# Patient Record
Sex: FEMALE | Race: WHITE | NOT HISPANIC OR LATINO
[De-identification: names, ages, dates, MRNs, and addresses within clinical notes are randomized per-mention and may not be internally consistent; named-entity substitution may affect disease eponyms.]

---

## 2017-08-07 ENCOUNTER — APPOINTMENT (OUTPATIENT)
Dept: PULMONOLOGY | Facility: CLINIC | Age: 52
End: 2017-08-07
Payer: COMMERCIAL

## 2017-08-07 VITALS
BODY MASS INDEX: 30.21 KG/M2 | WEIGHT: 160 LBS | DIASTOLIC BLOOD PRESSURE: 70 MMHG | HEIGHT: 61 IN | RESPIRATION RATE: 16 BRPM | OXYGEN SATURATION: 99 % | SYSTOLIC BLOOD PRESSURE: 110 MMHG | HEART RATE: 98 BPM

## 2017-08-07 DIAGNOSIS — Z78.9 OTHER SPECIFIED HEALTH STATUS: ICD-10-CM

## 2017-08-07 DIAGNOSIS — Z80.1 FAMILY HISTORY OF MALIGNANT NEOPLASM OF TRACHEA, BRONCHUS AND LUNG: ICD-10-CM

## 2017-08-07 DIAGNOSIS — Z82.49 FAMILY HISTORY OF ISCHEMIC HEART DISEASE AND OTHER DISEASES OF THE CIRCULATORY SYSTEM: ICD-10-CM

## 2017-08-07 DIAGNOSIS — Z82.5 FAMILY HISTORY OF ASTHMA AND OTHER CHRONIC LOWER RESPIRATORY DISEASES: ICD-10-CM

## 2017-08-07 DIAGNOSIS — Z87.09 PERSONAL HISTORY OF OTHER DISEASES OF THE RESPIRATORY SYSTEM: ICD-10-CM

## 2017-08-07 DIAGNOSIS — F41.9 ANXIETY DISORDER, UNSPECIFIED: ICD-10-CM

## 2017-08-07 DIAGNOSIS — Z87.59 PERSONAL HISTORY OF OTHER COMPLICATIONS OF PREGNANCY, CHILDBIRTH AND THE PUERPERIUM: ICD-10-CM

## 2017-08-07 DIAGNOSIS — Z87.891 PERSONAL HISTORY OF NICOTINE DEPENDENCE: ICD-10-CM

## 2017-08-07 PROCEDURE — 94729 DIFFUSING CAPACITY: CPT

## 2017-08-07 PROCEDURE — 99205 OFFICE O/P NEW HI 60 MIN: CPT | Mod: 25

## 2017-08-07 PROCEDURE — 94727 GAS DIL/WSHOT DETER LNG VOL: CPT

## 2017-08-07 PROCEDURE — 71020: CPT

## 2017-08-07 PROCEDURE — 94620 PULMONARY STRESS TESTING SIMPLE: CPT

## 2017-08-07 PROCEDURE — 94010 BREATHING CAPACITY TEST: CPT | Mod: 59

## 2017-08-07 RX ORDER — FLUTICASONE PROPIONATE 50 MCG
50 SPRAY, SUSPENSION NASAL
Refills: 0 | Status: ACTIVE | COMMUNITY

## 2017-08-07 RX ORDER — FEXOFENADINE HCL 60 MG
CAPSULE ORAL
Refills: 0 | Status: ACTIVE | COMMUNITY

## 2017-08-07 RX ORDER — MONTELUKAST 10 MG/1
10 TABLET, FILM COATED ORAL
Qty: 1 | Refills: 1 | Status: ACTIVE | COMMUNITY
Start: 2017-08-07 | End: 1900-01-01

## 2017-08-07 RX ORDER — ERGOCALCIFEROL 1.25 MG/1
1.25 MG CAPSULE, LIQUID FILLED ORAL
Refills: 0 | Status: ACTIVE | COMMUNITY

## 2017-08-07 RX ORDER — OMEPRAZOLE 40 MG/1
40 CAPSULE, DELAYED RELEASE ORAL
Refills: 0 | Status: ACTIVE | COMMUNITY

## 2017-08-07 RX ORDER — OLOPATADINE HYDROCHLORIDE 665 UG/1
0.6 SPRAY, METERED NASAL
Qty: 3 | Refills: 1 | Status: ACTIVE | COMMUNITY
Start: 2017-08-07 | End: 1900-01-01

## 2017-09-22 ENCOUNTER — APPOINTMENT (OUTPATIENT)
Dept: PULMONOLOGY | Facility: CLINIC | Age: 52
End: 2017-09-22

## 2019-06-10 ENCOUNTER — EMERGENCY (EMERGENCY)
Age: 54
LOS: 1 days | Discharge: HOME | End: 2019-06-10
Admitting: EMERGENCY MEDICINE
Payer: COMMERCIAL

## 2019-06-10 VITALS
HEART RATE: 62 BPM | RESPIRATION RATE: 18 BRPM | TEMPERATURE: 99 F | DIASTOLIC BLOOD PRESSURE: 74 MMHG | SYSTOLIC BLOOD PRESSURE: 114 MMHG | OXYGEN SATURATION: 100 %

## 2019-06-10 VITALS
OXYGEN SATURATION: 99 % | SYSTOLIC BLOOD PRESSURE: 104 MMHG | TEMPERATURE: 98 F | HEART RATE: 60 BPM | DIASTOLIC BLOOD PRESSURE: 70 MMHG | RESPIRATION RATE: 19 BRPM

## 2019-06-10 PROCEDURE — 72100 X-RAY EXAM L-S SPINE 2/3 VWS: CPT | Mod: 26

## 2019-06-10 PROCEDURE — 73080 X-RAY EXAM OF ELBOW: CPT | Mod: 26,LT

## 2019-06-10 PROCEDURE — 73564 X-RAY EXAM KNEE 4 OR MORE: CPT | Mod: 26,LT

## 2019-06-10 PROCEDURE — 73030 X-RAY EXAM OF SHOULDER: CPT | Mod: 26,LT

## 2019-06-10 PROCEDURE — 99284 EMERGENCY DEPT VISIT MOD MDM: CPT

## 2019-06-10 RX ORDER — LIDOCAINE 4 G/100G
1 CREAM TOPICAL ONCE
Refills: 0 | Status: COMPLETED | OUTPATIENT
Start: 2019-06-10 | End: 2019-06-10

## 2019-06-10 RX ORDER — ACETAMINOPHEN 500 MG
650 TABLET ORAL ONCE
Refills: 0 | Status: COMPLETED | OUTPATIENT
Start: 2019-06-10 | End: 2019-06-10

## 2019-06-10 RX ORDER — IBUPROFEN 200 MG
600 TABLET ORAL ONCE
Refills: 0 | Status: COMPLETED | OUTPATIENT
Start: 2019-06-10 | End: 2019-06-10

## 2019-06-10 RX ADMIN — LIDOCAINE 1 PATCH: 4 CREAM TOPICAL at 16:58

## 2019-06-10 RX ADMIN — Medication 650 MILLIGRAM(S): at 16:57

## 2019-06-10 RX ADMIN — Medication 600 MILLIGRAM(S): at 16:57

## 2019-06-10 NOTE — ED STATDOCS - OBJECTIVE STATEMENT
I performed a medical screening examination and determined this patient to be medically stable and will transfer to the [facility] adult ED for further care. heart and lung exam done and both did not reveal concerns for immediate intervention. Vital signs reviewed.  [facility] red attending aware. Isis Olivo MD

## 2019-06-10 NOTE — ED PROVIDER NOTE - NSFOLLOWUPINSTRUCTIONS_ED_ALL_ED_FT
Follow up with your primary care provider within 48 hours  Take Motrin 600mg every 6 hours for pain, take with food  Apply heat to the low back for pain relief  Return to the ER with any worsening or concerning symptoms, increased pain, weakness, numbness, bowel or bladder incontinence or any other concerns.
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
## 2019-06-10 NOTE — ED PROVIDER NOTE - PHYSICAL EXAMINATION
msk: no c-spine or midline spinal tenderness  Neuro: A&Ox3, PERRL, CN II-VII intact, sensation intact and equal b/l, strength 5/5 in all extremities, no gait abnormality, normal finger to nose, normal rapid alternating movements

## 2019-06-10 NOTE — ED PROVIDER NOTE - CHPI ED SYMPTOMS NEG
Denies dizziness, nausea, vomiting, difficulty ambulating, LOC, blood thinner use, neck pain, or any other concerns/no loss of consciousness
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
## 2019-07-08 ENCOUNTER — RESULT REVIEW (OUTPATIENT)
Age: 54
End: 2019-07-08

## 2020-03-09 PROBLEM — Z78.9 OTHER SPECIFIED HEALTH STATUS: Chronic | Status: ACTIVE | Noted: 2019-06-10

## 2020-03-10 ENCOUNTER — APPOINTMENT (OUTPATIENT)
Dept: PULMONOLOGY | Facility: CLINIC | Age: 55
End: 2020-03-10
Payer: COMMERCIAL

## 2020-03-10 ENCOUNTER — NON-APPOINTMENT (OUTPATIENT)
Age: 55
End: 2020-03-10

## 2020-03-10 VITALS
SYSTOLIC BLOOD PRESSURE: 122 MMHG | RESPIRATION RATE: 17 BRPM | HEIGHT: 60 IN | OXYGEN SATURATION: 98 % | DIASTOLIC BLOOD PRESSURE: 80 MMHG | HEART RATE: 64 BPM | WEIGHT: 163 LBS | BODY MASS INDEX: 32 KG/M2

## 2020-03-10 PROCEDURE — 94010 BREATHING CAPACITY TEST: CPT

## 2020-03-10 PROCEDURE — 94618 PULMONARY STRESS TESTING: CPT

## 2020-03-10 PROCEDURE — 71046 X-RAY EXAM CHEST 2 VIEWS: CPT

## 2020-03-10 PROCEDURE — 99204 OFFICE O/P NEW MOD 45 MIN: CPT | Mod: 25

## 2020-03-10 PROCEDURE — 95012 NITRIC OXIDE EXP GAS DETER: CPT

## 2020-03-10 RX ORDER — SERTRALINE HYDROCHLORIDE 100 MG/1
100 TABLET, FILM COATED ORAL
Refills: 0 | Status: DISCONTINUED | COMMUNITY
End: 2020-03-10

## 2020-03-10 RX ORDER — ESCITALOPRAM OXALATE 20 MG/1
20 TABLET, FILM COATED ORAL
Refills: 0 | Status: ACTIVE | COMMUNITY

## 2020-03-10 RX ORDER — OMEGA-3-ACID ETHYL ESTERS 1 G/1
1 CAPSULE, LIQUID FILLED ORAL
Refills: 0 | Status: ACTIVE | COMMUNITY

## 2020-03-10 RX ORDER — RANITIDINE HYDROCHLORIDE 300 MG/1
300 CAPSULE ORAL
Qty: 1 | Refills: 1 | Status: DISCONTINUED | COMMUNITY
Start: 2017-08-07 | End: 2020-03-10

## 2020-03-10 NOTE — HISTORY OF PRESENT ILLNESS
[FreeTextEntry1] : Meera Gregg is a 55 y/o female presenting to the office today for a follow up regarding a CC of \par - she notes when she takes a deep breathe, she gets chest pain. This started about 2-3 days ago. Before that, this discomfort has been on and off for awhile. \par - She started a new job in September. \par - she notes she has SOB on her back, sitting still, and in the middle of the night \par - She notes she has always had GERD\par - She notes her bowels are regular \par - she notes she has been getting enough sleep since she got her new job. \par - she notes her memory and concentration has gotten worse. \par - she notes she still gets severe migraines. \par -She did a sleep study ([facility]) was told she has mild sleep apnea. \par - she has been \par -she denies any headaches, nausea, vomiting, fever, chills, sweats, chest pain, chest pressure, diarrhea, constipation, dysphagia, dizziness, sour taste in the mouth, leg swelling, leg pain, itchy eyes, itchy ears. \par

## 2020-03-10 NOTE — PHYSICAL EXAM
[General Appearance - Well Developed] : well developed [Normal Appearance] : normal appearance [Well Groomed] : well groomed [General Appearance - Well Nourished] : well nourished [No Deformities] : no deformities [General Appearance - In No Acute Distress] : no acute distress [Normal Conjunctiva] : the conjunctiva exhibited no abnormalities [Eyelids - No Xanthelasma] : the eyelids demonstrated no xanthelasmas [Normal Oropharynx] : normal oropharynx [III] : III [Neck Appearance] : the appearance of the neck was normal [Neck Cervical Mass (___cm)] : no neck mass was observed [Jugular Venous Distention Increased] : there was no jugular-venous distention [Thyroid Diffuse Enlargement] : the thyroid was not enlarged [Thyroid Nodule] : there were no palpable thyroid nodules [Heart Rate And Rhythm] : heart rate and rhythm were normal [Heart Sounds] : normal S1 and S2 [Murmurs] : no murmurs present [Respiration, Rhythm And Depth] : normal respiratory rhythm and effort [Exaggerated Use Of Accessory Muscles For Inspiration] : no accessory muscle use [Auscultation Breath Sounds / Voice Sounds] : lungs were clear to auscultation bilaterally [Abdomen Soft] : soft [Abdomen Tenderness] : non-tender [Abdomen Mass (___ Cm)] : no abdominal mass palpated [Abnormal Walk] : normal gait [Gait - Sufficient For Exercise Testing] : the gait was sufficient for exercise testing [Nail Clubbing] : no clubbing of the fingernails [Cyanosis, Localized] : no localized cyanosis [Petechial Hemorrhages (___cm)] : no petechial hemorrhages [Skin Color & Pigmentation] : normal skin color and pigmentation [Skin Turgor] : normal skin turgor [] : no rash [Deep Tendon Reflexes (DTR)] : deep tendon reflexes were 2+ and symmetric [Sensation] : the sensory exam was normal to light touch and pinprick [No Focal Deficits] : no focal deficits [Oriented To Time, Place, And Person] : oriented to person, place, and time [Impaired Insight] : insight and judgment were intact [Affect] : the affect was normal [FreeTextEntry1] : I:E ratio 1:3; clear

## 2020-03-10 NOTE — PROCEDURE
[FreeTextEntry1] : PFT revealed normal flows, with a FEV1 of 3.02 L, which is 129% of predicted, with a normal flow volume loop\par \par FENO was 9; a normal value being less than 25\par Fractional exhaled nitric oxide (FENO) is regarded as a simple, noninvasive method for assessing eosinophilic airway inflammation. Produced by a variety of cells within the lung, nitric oxide (NO) concentrations are generally low in healthy individuals. However, high concentrations of NO appear to be involved in nonspecific host defense mechanisms and chronic inflammatory diseases such as asthma. The American Thoracic Society (ATS) therefore has recommended using FENO to aid in the diagnosis and monitoring of eosinophilic airway inflammation and asthma, and for identifying steroid responsive individuals whose chronic respiratory symptoms may be caused by airway inflammation.\par \par 6 minute walk test reveals a low saturation of 95 % with no evidence of dyspnea or fatigue; walked 684.6 meters\par \par \par CXR reveals a normal sized heart; no evidence of infiltrate or effusion--a normal appearing chest radiograph\par \par

## 2020-03-10 NOTE — ADDENDUM
[FreeTextEntry1] : Documented by Birdie Bridges acting as a scribe for Dr. Santiago Denton on 03/10/2020.\par \par All medical record entries made by the Scribe were at my, Dr. Santiago Denton's, direction and personally dictated by me on 03/10/2020. I have reviewed the chart and agree that the record accurately reflects my personal performance of the history, physical exam, assessment and plan. I have also personally directed, reviewed, and agree with the discharge instructions.\par

## 2020-03-10 NOTE — ASSESSMENT
[FreeTextEntry1] : Mr.s Gregg is a 55 y/o female with a pmhx of 10 pack year smoker, cholesterol, migraines, TMJ, anxiety, asthma, allergies, low vitamin D, OW, Mil ; here for an evaluation of breathing  / recent PNA / SOB - recent DULCE MARIA (mild)\par \par Problem one: SOB is multifactorial \par -over weight/ out of shape\par -poor breathing mechanics\par -asthma \par -?Cardiac disease\par -anemia\par \par Problem 1: GERD\par - add Protonix 40 mg before breakfast  / Add Pepcid 40 mg QHS\par -Rule of 2s: avoid eating too much, eating too fast, eating too late, eating too spicy, eating too lousy, eating two hours before bed.\par -Things to avoid including overeating, spicy foods, tight clothing, eating within three hours of bed, this list is not all inclusive. \par -For treatment of reflux, possible options discussed including diet control, H2 blockers, PPIs, as well as coating motility agents discussed as treatment options. Timing of meals and proximity of last meal to sleep were discussed. If symptoms persist, a formal gastrointestinal evaluation is needed.\par \par Problem 2: poor breathing mechanics \par - -Proper breathing techniques were reviewed with an emphasis of exhalation. Patient instructed to breath in for 1 second and out for four seconds. Patient was encouraged to not talk while walking. \par \par Problem 3: over weight \par  -Weight loss, exercise, and diet control were discussed and are highly encouraged. Treatment options were given such as, aqua therapy, and contacting a nutritionist. Recommended to use the elliptical, stationary bike, less use of treadmill.  Obesity is associated with worsening asthma, shortness of breath, and potential for cardiac disease, diabetes, and other underlying medical conditions. \par \par Problem 4: Asthma \par -Breo 200 1 puff QD \par -Xopenex PRN for rescue \par  -Asthma is  believed to be caused by inherited (genetic) and environmental factor, but its exact cause is unknown. Asthma may be triggered by allergens, lung infections, or irritants in the air. Asthma triggers are different for each person \par \par Problem 5: Allergies\par - continue Flonase 1 sniff BID \par - continue Olopatadine (.6) 1 sniff BID \par - Environmental measures for allergies were encouraged including mattress and pillow cover, air purifier, and environmental controls. \par \par Problem 6: (+) OSAS (likely fibromyalgia related to sleep)\par - S/p HSS - c/w mild sleep apnea \par - Recommend Dental Device - Ryegate \par - Discussed the risks/associations with coronary artery disease, atrial fibrillation, arrhythmia, memory loss, issues with concentration, stroke risk, hypertension, nocturia, chronic reflux/Ramsey’s esophagus some but not all inclusive. Treatment options discussed including CPAP/BiPAP machine, oral appliance, ProVent therapy, Oxy-Aid by Respitec, new technologies, or positional sleep. \par -recommended sleep guard QD at bedtime \par -Good sleep hygiene was encouraged including avoiding watching television an hour before bed, keeping caffeine at a low,  avoiding reading, television, or anything, in bed, no drinking any liquids three hours before bedtime, and only getting into bed when tired and ready for sleep. \par - Sleep apnea is associated with adverse clinical consequences which an affect most organ systems. Cardiovascular disease risk includes arrhythmias, atrial fibrillation, hypertension, coronary artery disease, and stroke. Metabolic disorders include diabetes type 2, non-alcoholic fatty liver disease. Mood disorder especially depression; and cognitive decline especially in the elderly. Associations with chronic reflux/Ramsey’s esophagus some but not all inclusive. \par -Reasons include arousal consistent with hypopnea; respiratory events most prominent in REM sleep or supine position; therefore sleep staging and body position are important for accurate diagnosis and estimation of AHI.\par \par \par F/u in 3-4 months  \par pt is encouraged to call or fax the office with any questions or concerns. \par Pt is to exercise and diet to promote a healthy weight. \par Explained to the pt in full detail with demonstrations how to use the inhalers and inhaler hygiene. \par

## 2020-09-15 ENCOUNTER — APPOINTMENT (OUTPATIENT)
Dept: PULMONOLOGY | Facility: CLINIC | Age: 55
End: 2020-09-15
Payer: COMMERCIAL

## 2020-09-15 VITALS
RESPIRATION RATE: 17 BRPM | OXYGEN SATURATION: 98 % | WEIGHT: 174 LBS | DIASTOLIC BLOOD PRESSURE: 76 MMHG | BODY MASS INDEX: 34.16 KG/M2 | HEIGHT: 60 IN | TEMPERATURE: 96.9 F | HEART RATE: 65 BPM | SYSTOLIC BLOOD PRESSURE: 126 MMHG

## 2020-09-15 DIAGNOSIS — Z23 ENCOUNTER FOR IMMUNIZATION: ICD-10-CM

## 2020-09-15 PROCEDURE — 95012 NITRIC OXIDE EXP GAS DETER: CPT

## 2020-09-15 PROCEDURE — 90682 RIV4 VACC RECOMBINANT DNA IM: CPT

## 2020-09-15 PROCEDURE — 99214 OFFICE O/P EST MOD 30 MIN: CPT | Mod: 25

## 2020-09-15 PROCEDURE — G0008: CPT

## 2020-09-15 NOTE — HISTORY OF PRESENT ILLNESS
[FreeTextEntry1] : Meera Gregg is a 53 y/o female presenting to the office today for a follow up visit. Her chief complaint is active asthma\par -she reports she has had 3 asthma attacks over the past week. 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\par -she states she had some SOB, and wasn’t able to find her inhalers\par -she was using her Breo daily, but didn’t feel any improvement\par -she notes she has some reflux, and taking her GERD medication a few times per week\par -she notes she is eating healthier but is gaining weight\par -she notes she is sleeping well, but hasn’t been able to get her sleep study yet\par -she notes she has been having more severe allergy sx recently, and is taking allergy medication daily\par -she denies any headaches, nausea, vomiting, fever, chills, sweats, chest pain, chest pressure, diarrhea, constipation, dysphagia, dizziness, sour taste in the mouth, leg swelling, leg pain, itchy eyes, itchy ears, heartburn, reflux, myalgias or arthralgias.

## 2020-09-15 NOTE — ASSESSMENT
[FreeTextEntry1] : Mr.s Gregg is a 56 y/o female with a pmhx of 10 pack year smoker, cholesterol, migraines, TMJ, anxiety, asthma, allergies, low vitamin D, OW, Mil ; here for an evaluation of breathing  / recent PNA / SOB - recent DULCE MARIA (mild) - ?active asthma / allergy\par \par Problem one: SOB is multifactorial \par -over weight/ out of shape\par -poor breathing mechanics\par -asthma \par -?Cardiac disease\par -anemia\par \par Problem 1: GERD\par - add Protonix 40 mg before breakfast  / Add Pepcid 40 mg QHS\par -Rule of 2s: avoid eating too much, eating too fast, eating too late, eating too spicy, eating too lousy, eating two hours before bed.\par -Things to avoid including overeating, spicy foods, tight clothing, eating within three hours of bed, this list is not all inclusive. \par -For treatment of reflux, possible options discussed including diet control, H2 blockers, PPIs, as well as coating motility agents discussed as treatment options. Timing of meals and proximity of last meal to sleep were discussed. If symptoms persist, a formal gastrointestinal evaluation is needed.\par \par Problem 2: poor breathing mechanics \par - -Proper breathing techniques were reviewed with an emphasis of exhalation. Patient instructed to breath in for 1 second and out for four seconds. Patient was encouraged to not talk while walking. \par \par Problem 3: over weight \par  -Weight loss, exercise, and diet control were discussed and are highly encouraged. Treatment options were given such as, aqua therapy, and contacting a nutritionist. Recommended to use the elliptical, stationary bike, less use of treadmill.  Obesity is associated with worsening asthma, shortness of breath, and potential for cardiac disease, diabetes, and other underlying medical conditions. \par \par Problem 4: Asthma (?active)\par -DC Breo 200 1 puff QD - transition to Trelegy 1 inhalation QD\par -Xopenex PRN for rescue \par -add Singulair 10 mg before bed \par  -Asthma is  believed to be caused by inherited (genetic) and environmental factor, but its exact cause is unknown. Asthma may be triggered by allergens, lung infections, or irritants in the air. Asthma triggers are different for each person \par \par Problem 5: Allergies\par - continue Flonase 1 sniff BID \par - continue Olopatadine (.6) 1 sniff BID \par -Add Clarinex 5 mg before bed \par - Environmental measures for allergies were encouraged including mattress and pillow cover, air purifier, and environmental controls. \par \par Problem 6: (+) OSAS (likely fibromyalgia related to sleep)\par - S/p HSS - c/w mild sleep apnea (over 5 years ago)\par - Recommend Dental Device - Puposky \par - Discussed the risks/associations with coronary artery disease, atrial fibrillation, arrhythmia, memory loss, issues with concentration, stroke risk, hypertension, nocturia, chronic reflux/Ramsey’s esophagus some but not all inclusive. Treatment options discussed including CPAP/BiPAP machine, oral appliance, ProVent therapy, Oxy-Aid by Respitec, new technologies, or positional sleep. \par -recommended sleep guard QD at bedtime \par -Good sleep hygiene was encouraged including avoiding watching television an hour before bed, keeping caffeine at a low,  avoiding reading, television, or anything, in bed, no drinking any liquids three hours before bedtime, and only getting into bed when tired and ready for sleep. \par - Sleep apnea is associated with adverse clinical consequences which an affect most organ systems. Cardiovascular disease risk includes arrhythmias, atrial fibrillation, hypertension, coronary artery disease, and stroke. Metabolic disorders include diabetes type 2, non-alcoholic fatty liver disease. Mood disorder especially depression; and cognitive decline especially in the elderly. Associations with chronic reflux/Ramsey’s esophagus some but not all inclusive. \par -Reasons include arousal consistent with hypopnea; respiratory events most prominent in REM sleep or supine position; therefore sleep staging and body position are important for accurate diagnosis and estimation of AHI.\par \par Problem 5: health maintenance\par -s/p influenza vaccine 2020\par -recommended strep pneumonia vaccines after age 65: Prevnar-13 vaccine, followed by Pneumo vaccine 23 one year following\par -recommended early intervention for URIs\par -recommended regular osteoporosis evaluations\par -recommended early dermatological evaluations\par -recommended after the age of 50 to the age of 70, colonoscopy every 5 years \par \par F/u in 3-4 months  \par pt is encouraged to call or fax the office with any questions or concerns. \par Pt is to exercise and diet to promote a healthy weight. \par Explained to the pt in full detail with demonstrations how to use the inhalers and inhaler hygiene. \par

## 2020-09-15 NOTE — PHYSICAL EXAM
[General Appearance - Well Developed] : well developed [Well Groomed] : well groomed [Normal Appearance] : normal appearance [General Appearance - Well Nourished] : well nourished [General Appearance - In No Acute Distress] : no acute distress [No Deformities] : no deformities [Eyelids - No Xanthelasma] : the eyelids demonstrated no xanthelasmas [Normal Conjunctiva] : the conjunctiva exhibited no abnormalities [III] : III [Normal Oropharynx] : normal oropharynx [Neck Appearance] : the appearance of the neck was normal [Neck Cervical Mass (___cm)] : no neck mass was observed [Thyroid Diffuse Enlargement] : the thyroid was not enlarged [Jugular Venous Distention Increased] : there was no jugular-venous distention [Thyroid Nodule] : there were no palpable thyroid nodules [Heart Rate And Rhythm] : heart rate and rhythm were normal [Heart Sounds] : normal S1 and S2 [Murmurs] : no murmurs present [Respiration, Rhythm And Depth] : normal respiratory rhythm and effort [Exaggerated Use Of Accessory Muscles For Inspiration] : no accessory muscle use [Auscultation Breath Sounds / Voice Sounds] : lungs were clear to auscultation bilaterally [Abdomen Soft] : soft [Abdomen Tenderness] : non-tender [Abnormal Walk] : normal gait [Abdomen Mass (___ Cm)] : no abdominal mass palpated [Gait - Sufficient For Exercise Testing] : the gait was sufficient for exercise testing [Cyanosis, Localized] : no localized cyanosis [Petechial Hemorrhages (___cm)] : no petechial hemorrhages [Nail Clubbing] : no clubbing of the fingernails [Skin Turgor] : normal skin turgor [Skin Color & Pigmentation] : normal skin color and pigmentation [] : no rash [Deep Tendon Reflexes (DTR)] : deep tendon reflexes were 2+ and symmetric [Sensation] : the sensory exam was normal to light touch and pinprick [No Focal Deficits] : no focal deficits [Oriented To Time, Place, And Person] : oriented to person, place, and time [Impaired Insight] : insight and judgment were intact [Affect] : the affect was normal [FreeTextEntry1] : I:E ratio 1:3; clear

## 2020-09-15 NOTE — REVIEW OF SYSTEMS
[Negative] : Psychiatric [Dyspnea] : dyspnea [Seasonal Allergies] : seasonal allergies [Chest Discomfort] : no chest discomfort [GERD] : no gerd [Diarrhea] : no diarrhea [Constipation] : no constipation [Dysphagia] : no dysphagia [Dizziness] : no dizziness

## 2020-09-15 NOTE — PROCEDURE
[FreeTextEntry1] : FENO was 27; a normal value being less than 25\par Fractional exhaled nitric oxide (FENO) is regarded as a simple, noninvasive method for assessing eosinophilic airway inflammation. Produced by a variety of cells within the lung, nitric oxide (NO) concentrations are generally low in healthy individuals. However, high concentrations of NO appear to be involved in nonspecific host defense mechanisms and chronic inflammatory diseases such as asthma. The American Thoracic Society (ATS) therefore has recommended using FENO to aid in the diagnosis and monitoring of eosinophilic airway inflammation and asthma, and for identifying steroid responsive individuals whose chronic respiratory symptoms may be caused by airway inflammation.

## 2020-11-30 ENCOUNTER — RX RENEWAL (OUTPATIENT)
Age: 55
End: 2020-11-30

## 2020-12-26 ENCOUNTER — LABORATORY RESULT (OUTPATIENT)
Age: 55
End: 2020-12-26

## 2020-12-29 ENCOUNTER — APPOINTMENT (OUTPATIENT)
Dept: PULMONOLOGY | Facility: CLINIC | Age: 55
End: 2020-12-29
Payer: COMMERCIAL

## 2020-12-29 PROCEDURE — 95012 NITRIC OXIDE EXP GAS DETER: CPT

## 2020-12-29 PROCEDURE — 94010 BREATHING CAPACITY TEST: CPT

## 2020-12-29 PROCEDURE — 99214 OFFICE O/P EST MOD 30 MIN: CPT | Mod: 25

## 2020-12-29 PROCEDURE — 94727 GAS DIL/WSHOT DETER LNG VOL: CPT

## 2020-12-29 PROCEDURE — 94729 DIFFUSING CAPACITY: CPT

## 2020-12-29 PROCEDURE — 99072 ADDL SUPL MATRL&STAF TM PHE: CPT

## 2020-12-29 RX ORDER — PITAVASTATIN CALCIUM 2.09 MG/1
2 TABLET, FILM COATED ORAL
Qty: 90 | Refills: 0 | Status: ACTIVE | COMMUNITY
Start: 2020-12-29 | End: 1900-01-01

## 2020-12-29 NOTE — PROCEDURE
[FreeTextEntry1] : Full PFT revealed normal flows, with a FEV1 of 2.89L ,which is 125% of predicted, normal lung volumes, and a normal diffusion of 19.7, which is 91% of predicted with a normal flow volume loop \par \par FENO was 7; a normal value being less than 25\par Fractional exhaled nitric oxide (FENO) is regarded as a simple, noninvasive method for assessing eosinophilic airway inflammation. Produced by a variety of cells within the lung, nitric oxide (NO) concentrations are generally low in healthy individuals. However, high concentrations of NO appear to be involved in nonspecific host defense mechanisms and chronic inflammatory diseases such as asthma. The American Thoracic Society (ATS) therefore has recommended using FENO to aid in the diagnosis and monitoring of eosinophilic airway inflammation and asthma, and for identifying steroid responsive individuals whose chronic respiratory symptoms may be caused by airway inflammation.

## 2020-12-29 NOTE — ASSESSMENT
[FreeTextEntry1] : Mr.s Gregg is a 54 y/o female with a pmhx of 10 pack year smoker, cholesterol, migraines, TMJ, anxiety, asthma, allergies, low vitamin D, OW, Mil ; here for an evaluation of breathing  / recent PNA / SOB - DULCE MARIA (mild) - asthma / allergy - stable.\par \par Problem one: SOB is multifactorial \par -over weight/ out of shape\par -poor breathing mechanics\par -asthma \par -?Cardiac disease\par -anemia\par \par Problem 1: GERD\par -continue Protonix 40 mg before breakfast \par -continue Pepcid 40 mg QHS\par -Rule of 2s: avoid eating too much, eating too fast, eating too late, eating too spicy, eating too lousy, eating two hours before bed.\par -Things to avoid including overeating, spicy foods, tight clothing, eating within three hours of bed, this list is not all inclusive. \par -For treatment of reflux, possible options discussed including diet control, H2 blockers, PPIs, as well as coating motility agents discussed as treatment options. Timing of meals and proximity of last meal to sleep were discussed. If symptoms persist, a formal gastrointestinal evaluation is needed.\par \par Problem 2: poor breathing mechanics \par - -Proper breathing techniques were reviewed with an emphasis of exhalation. Patient instructed to breath in for 1 second and out for four seconds. Patient was encouraged to not talk while walking. \par \par Problem 3: over weight \par  -Weight loss, exercise, and diet control were discussed and are highly encouraged. Treatment options were given such as, aqua therapy, and contacting a nutritionist. Recommended to use the elliptical, stationary bike, less use of treadmill.  Obesity is associated with worsening asthma, shortness of breath, and potential for cardiac disease, diabetes, and other underlying medical conditions. \par \par Problem 4: Asthma (?active)\par -continue Breo 1 inhalation QD\par -Xopenex PRN for rescue \par -continue Singulair 10 mg before bed \par  -Asthma is  believed to be caused by inherited (genetic) and environmental factor, but its exact cause is unknown. Asthma may be triggered by allergens, lung infections, or irritants in the air. Asthma triggers are different for each person \par \par Problem 5: Allergies\par - continue Flonase 1 sniff BID \par - continue Olopatadine (.6) 1 sniff BID \par -Add Clarinex 5 mg before bed \par - Environmental measures for allergies were encouraged including mattress and pillow cover, air purifier, and environmental controls. \par \par Problem 6: (+) OSAS (likely fibromyalgia related to sleep)\par - S/p HSS - c/w mild sleep apnea (over 5 years ago)\par - Recommend Dental Device - [place] \par - Discussed the risks/associations with coronary artery disease, atrial fibrillation, arrhythmia, memory loss, issues with concentration, stroke risk, hypertension, nocturia, chronic reflux/Ramsey’s esophagus some but not all inclusive. Treatment options discussed including CPAP/BiPAP machine, oral appliance, ProVent therapy, Oxy-Aid by [employer]pite[employer], new technologies, or positional sleep. \par -recommended sleep guard QD at bedtime \par -Good sleep hygiene was encouraged including avoiding watching television an hour before bed, keeping caffeine at a low,  avoiding reading, television, or anything, in bed, no drinking any liquids three hours before bedtime, and only getting into bed when tired and ready for sleep. \par - Sleep apnea is associated with adverse clinical consequences which an affect most organ systems. Cardiovascular disease risk includes arrhythmias, atrial fibrillation, hypertension, coronary artery disease, and stroke. Metabolic disorders include diabetes type 2, non-alcoholic fatty liver disease. Mood disorder especially depression; and cognitive decline especially in the elderly. Associations with chronic reflux/Ramsey’s esophagus some but not all inclusive. \par -Reasons include arousal consistent with hypopnea; respiratory events most prominent in REM sleep or supine position; therefore sleep staging and body position are important for accurate diagnosis and estimation of AHI.\par \par Problem 5: health maintenance\par -s/p influenza vaccine 2020\par -recommended strep pneumonia vaccines after age 65: Prevnar-13 vaccine, followed by Pneumo vaccine 23 one year following\par -recommended early intervention for URIs\par -recommended regular osteoporosis evaluations\par -recommended early dermatological evaluations\par -recommended after the age of 50 to the age of 70, colonoscopy every 5 years \par \par F/u in 3-4 months  \par pt is encouraged to call or fax the office with any questions or concerns. \par Pt is to exercise and diet to promote a healthy weight. \par Explained to the pt in full detail with demonstrations how to use the inhalers and inhaler hygiene. \par

## 2020-12-29 NOTE — REVIEW OF SYSTEMS
[Negative] : Endocrine [Recent Wt Gain (___ Lbs)] : ~T recent [unfilled] lb weight gain [EDS] : EDS [Chest Tightness] : chest tightness [Dizziness] : dizziness [Cough] : no cough [Wheezing] : no wheezing [Chest Discomfort] : no chest discomfort [Palpitations] : no palpitations [GERD] : no gerd [Diarrhea] : no diarrhea [Constipation] : no constipation [Dysphagia] : no dysphagia

## 2020-12-29 NOTE — ADDENDUM
[FreeTextEntry1] : Documented by Maycol Bond acting as a scribe for Dr. Santiago Denton on 12/29/2020.\par \par All medical record entries made by the Scribe were at my, Dr. Santiago Denton's, direction and personally dictated by me on 12/29/2020 . I have reviewed the chart and agree that the record accurately reflects my personal performance of the history, physical exam, assessment and plan. I have also personally directed, reviewed, and agree with the discharge instructions. \par

## 2020-12-29 NOTE — HISTORY OF PRESENT ILLNESS
[FreeTextEntry1] : Meera Gregg is a 54 y/o female with a history of allergies, asthma, GERD, DULCE MARIA, overweight, poor sleep, snoring, and SOB presenting to the office today for a follow up visit. Her chief complaint is lethargy.\par -she reports she has been feeling lethargic\par -she notes she has occasional chest pressure / pain. 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 She has an exercise bike in her house.\par -she states she is able to breathe better in the cold air\par -she notes she isnt sleeping well, and believes it is related to her new mattress. Her 's snoring keeps her up as well.\par -she denies any coughing, wheezing, palpitations, diarrhea, constipation, dysphagia, sour taste in the mouth, heartburn, reflux

## 2021-02-03 NOTE — ED PROVIDER NOTE - ATTESTATION, MLM
Clofazimine Pregnancy And Lactation Text: This medication is Pregnancy Category C and isn't considered safe during pregnancy. It is excreted in breast milk. I have reviewed and confirmed nurses' notes for patient's medications, allergies, medical history, and surgical history.

## 2021-03-02 ENCOUNTER — RX RENEWAL (OUTPATIENT)
Age: 56
End: 2021-03-02

## 2021-03-15 ENCOUNTER — RX RENEWAL (OUTPATIENT)
Age: 56
End: 2021-03-15

## 2021-04-02 ENCOUNTER — APPOINTMENT (OUTPATIENT)
Dept: PULMONOLOGY | Facility: CLINIC | Age: 56
End: 2021-04-02
Payer: COMMERCIAL

## 2021-04-02 VITALS
BODY MASS INDEX: 32.47 KG/M2 | DIASTOLIC BLOOD PRESSURE: 70 MMHG | TEMPERATURE: 96.9 F | WEIGHT: 172 LBS | HEART RATE: 65 BPM | SYSTOLIC BLOOD PRESSURE: 118 MMHG | RESPIRATION RATE: 17 BRPM | HEIGHT: 61 IN | OXYGEN SATURATION: 98 %

## 2021-04-02 PROCEDURE — 99072 ADDL SUPL MATRL&STAF TM PHE: CPT

## 2021-04-02 PROCEDURE — 99214 OFFICE O/P EST MOD 30 MIN: CPT | Mod: 25

## 2021-04-02 NOTE — PHYSICAL EXAM
[General Appearance - Well Developed] : well developed [Well Groomed] : well groomed [Normal Appearance] : normal appearance [General Appearance - Well Nourished] : well nourished [No Deformities] : no deformities [General Appearance - In No Acute Distress] : no acute distress [Normal Conjunctiva] : the conjunctiva exhibited no abnormalities [Eyelids - No Xanthelasma] : the eyelids demonstrated no xanthelasmas [Normal Oropharynx] : normal oropharynx [III] : III [Neck Appearance] : the appearance of the neck was normal [Neck Cervical Mass (___cm)] : no neck mass was observed [Jugular Venous Distention Increased] : there was no jugular-venous distention [Thyroid Diffuse Enlargement] : the thyroid was not enlarged [Thyroid Nodule] : there were no palpable thyroid nodules [Heart Rate And Rhythm] : heart rate and rhythm were normal [Heart Sounds] : normal S1 and S2 [Murmurs] : no murmurs present [Respiration, Rhythm And Depth] : normal respiratory rhythm and effort [Exaggerated Use Of Accessory Muscles For Inspiration] : no accessory muscle use [Auscultation Breath Sounds / Voice Sounds] : lungs were clear to auscultation bilaterally [Abdomen Soft] : soft [Abdomen Tenderness] : non-tender [Abdomen Mass (___ Cm)] : no abdominal mass palpated [Abnormal Walk] : normal gait [Gait - Sufficient For Exercise Testing] : the gait was sufficient for exercise testing [Nail Clubbing] : no clubbing of the fingernails [Cyanosis, Localized] : no localized cyanosis [Petechial Hemorrhages (___cm)] : no petechial hemorrhages [Skin Color & Pigmentation] : normal skin color and pigmentation [Skin Turgor] : normal skin turgor [] : no rash [Deep Tendon Reflexes (DTR)] : deep tendon reflexes were 2+ and symmetric [Sensation] : the sensory exam was normal to light touch and pinprick [No Focal Deficits] : no focal deficits [Oriented To Time, Place, And Person] : oriented to person, place, and time [Impaired Insight] : insight and judgment were intact [Affect] : the affect was normal [FreeTextEntry1] : I:E ratio 1:3; clear

## 2021-04-02 NOTE — ADDENDUM
[FreeTextEntry1] : Documented by Birdie Bridges acting as a scribe for Dr. Santiago Denton on 04/02/2021 \par \par All medical record entries made by the Scribe were at my, Dr. Santiago Denton's, direction and personally dictated by me on 04/02/2021 . I have reviewed the chart and agree that the record accurately reflects my personal performance of the history, physical exam, assessment and plan. I have also personally directed, reviewed, and agree with the discharge instructions.

## 2021-04-02 NOTE — ASSESSMENT
[FreeTextEntry1] : Mr.s Gregg is a 54 y/o female with a pmhx of 10 pack year smoker, cholesterol, migraines, TMJ, anxiety, asthma, allergies, low vitamin D, OW, Mil ; here for an evaluation of breathing  /  SOB - DULCE MARIA (mild) - asthma / allergy - stable., OW \par \par Problem one: SOB is multifactorial \par -over weight/ out of shape\par -poor breathing mechanics\par -asthma \par -?Cardiac disease\par -anemia\par \par Problem 1: GERD\par -continue Protonix 40 mg before breakfast \par -continue Pepcid 40 mg QHS\par -Rule of 2s: avoid eating too much, eating too fast, eating too late, eating too spicy, eating too lousy, eating two hours before bed.\par -Things to avoid including overeating, spicy foods, tight clothing, eating within three hours of bed, this list is not all inclusive. \par -For treatment of reflux, possible options discussed including diet control, H2 blockers, PPIs, as well as coating motility agents discussed as treatment options. Timing of meals and proximity of last meal to sleep were discussed. If symptoms persist, a formal gastrointestinal evaluation is needed.\par \par Problem 2: poor breathing mechanics \par - recommended Buteyko / Wimhof breathing methods \par - -Proper breathing techniques were reviewed with an emphasis of exhalation. Patient instructed to breath in for 1 second and out for four seconds. Patient was encouraged to not talk while walking. \par \par Problem 3: over weight \par  -Weight loss, exercise, and diet control were discussed and are highly encouraged. Treatment options were given such as, aqua therapy, and contacting a nutritionist. Recommended to use the elliptical, stationary bike, less use of treadmill.  Obesity is associated with worsening asthma, shortness of breath, and potential for cardiac disease, diabetes, and other underlying medical conditions. \par \par Problem 4: Asthma\par -continue Breo 1 inhalation QD (200)\par -Xopenex PRN for rescue \par -continue Singulair 10 mg before bed \par  -Asthma is  believed to be caused by inherited (genetic) and environmental factor, but its exact cause is unknown. Asthma may be triggered by allergens, lung infections, or irritants in the air. Asthma triggers are different for each person \par \par Problem 5: Allergies\par - continue Flonase 1 sniff BID \par - continue Olopatadine (.6) 1 sniff BID \par -Add Clarinex 5 mg before bed \par - Environmental measures for allergies were encouraged including mattress and pillow cover, air purifier, and environmental controls. \par \par Problem 6: (+) OSAS (likely fibromyalgia related to sleep)- noncompliant \par - S/p HSS - c/w mild sleep apnea (over 5 years ago)\par - Recommend Dental Device - [place] \par - recommended Slumber Bump, OxyAide, Sleeprite \par - Discussed the risks/associations with coronary artery disease, atrial fibrillation, arrhythmia, memory loss, issues with concentration, stroke risk, hypertension, nocturia, chronic reflux/Ramsey’s esophagus some but not all inclusive. Treatment options discussed including CPAP/BiPAP machine, oral appliance, ProVent therapy, Oxy-Aid by [employer], new technologies, or positional sleep. \par -recommended sleep guard QD at bedtime \par -Good sleep hygiene was encouraged including avoiding watching television an hour before bed, keeping caffeine at a low,  avoiding reading, television, or anything, in bed, no drinking any liquids three hours before bedtime, and only getting into bed when tired and ready for sleep. \par - Sleep apnea is associated with adverse clinical consequences which an affect most organ systems. Cardiovascular disease risk includes arrhythmias, atrial fibrillation, hypertension, coronary artery disease, and stroke. Metabolic disorders include diabetes type 2, non-alcoholic fatty liver disease. Mood disorder especially depression; and cognitive decline especially in the elderly. Associations with chronic reflux/Ramsey’s esophagus some but not all inclusive. \par -Reasons include arousal consistent with hypopnea; respiratory events most prominent in REM sleep or supine position; therefore sleep staging and body position are important for accurate diagnosis and estimation of AHI.\par \par Problem 5: health maintenance\par - s/p COVID-19 vaccine x2 (Moderna) \par -s/p influenza vaccine 2020\par -recommended strep pneumonia vaccines after age 65: Prevnar-13 vaccine, followed by Pneumo vaccine 23 one year following\par -recommended early intervention for URIs\par -recommended regular osteoporosis evaluations\par -recommended early dermatological evaluations\par -recommended after the age of 50 to the age of 70, colonoscopy every 5 years \par \par F/u in 3-4 months  \par pt is encouraged to call or fax the office with any questions or concerns. \par Pt is to exercise and diet to promote a healthy weight. \par Explained to the pt in full detail with demonstrations how to use the inhalers and inhaler hygiene. \par

## 2021-04-02 NOTE — HISTORY OF PRESENT ILLNESS
[FreeTextEntry1] : Meera Gregg is a 56 y/o female with a history of allergies, asthma, GERD, DULCE MARIA, overweight, poor sleep, snoring, and SOB presenting to the office today for a follow up visit. 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 \par - she notes she is working two jobs \par - she has not gotten her mouth piece for sleep apnea yet \par - she is s/p both COVID-19 vaccine (Moderna)\par - her breathing becomes labored if she does too much, she believes its due to her being out of shape \par - She  denies any visual issues, headaches, nausea, vomiting, fever, chills, sweats, chest pains, chest pressure, diarrhea, constipation, dysphagia, myalgia, dizziness, leg swelling, leg pain, itchy eyes, itchy ears.

## 2021-08-05 ENCOUNTER — APPOINTMENT (OUTPATIENT)
Dept: PULMONOLOGY | Facility: CLINIC | Age: 56
End: 2021-08-05

## 2021-10-19 ENCOUNTER — RX RENEWAL (OUTPATIENT)
Age: 56
End: 2021-10-19

## 2021-11-26 ENCOUNTER — NON-APPOINTMENT (OUTPATIENT)
Age: 56
End: 2021-11-26

## 2021-11-26 ENCOUNTER — APPOINTMENT (OUTPATIENT)
Dept: PULMONOLOGY | Facility: CLINIC | Age: 56
End: 2021-11-26
Payer: COMMERCIAL

## 2021-11-26 VITALS
DIASTOLIC BLOOD PRESSURE: 70 MMHG | TEMPERATURE: 97.2 F | HEIGHT: 61 IN | WEIGHT: 174 LBS | SYSTOLIC BLOOD PRESSURE: 120 MMHG | BODY MASS INDEX: 32.85 KG/M2 | OXYGEN SATURATION: 98 % | RESPIRATION RATE: 17 BRPM | HEART RATE: 80 BPM

## 2021-11-26 PROCEDURE — 94010 BREATHING CAPACITY TEST: CPT

## 2021-11-26 PROCEDURE — 99214 OFFICE O/P EST MOD 30 MIN: CPT | Mod: 25

## 2021-11-26 RX ORDER — FLUTICASONE FUROATE, UMECLIDINIUM BROMIDE AND VILANTEROL TRIFENATATE 100; 62.5; 25 UG/1; UG/1; UG/1
100-62.5-25 POWDER RESPIRATORY (INHALATION)
Qty: 3 | Refills: 1 | Status: DISCONTINUED | COMMUNITY
Start: 2020-09-15 | End: 2021-11-26

## 2021-11-26 NOTE — HISTORY OF PRESENT ILLNESS
[FreeTextEntry1] : Meera Gregg is a 54 y/o female with a history of allergies, asthma, GERD, DULCE MARIA, overweight, poor sleep, snoring, and SOB presenting to the office today for a follow up visit. Her chief complaint is\par -she notes sub mandible tenderness bilaterally\par -she notes sleeping on her side \par -She notes PND\par -she notes her bowels are regular \par -she notes her sense of smell and taste are poor\par -She notes walking 1.5-3 miles per day \par -She denies any visual issues\par -she notes constant fatigue\par -she notes snoring \par -she notes SOB when walking or with higher humidity levels \par -she notes reflux, exacerbated since her previous\par -she notes still waiting on DD for OSAS \par -s/p covid 19 vaccine x2 2/2021\par -She notes her weight is stable \par \par - patient denies any headaches, nausea, vomiting, fever, chills, sweats, chest pain, chest pressure, palpitations, coughing, wheezing, diarrhea, constipation, dysphagia, myalgias, dizziness, leg swelling, leg pain, itchy eyes, itchy ears, heartburn, reflux or sour taste in the mouth

## 2021-11-26 NOTE — ADDENDUM
[FreeTextEntry1] : Documented by Maycol Bond acting as a scribe for Dr. Santiago Denton on (11/26/2021).\par \par All medical record entries made by the Scribe were at my, Dr. Santiago Denton's, direction and personally dictated by me on (11/26/2021). I have reviewed the chart and agree that the record accurately reflects my personal performance of the history, physical exam, assessment and plan. I have also personally directed, reviewed, and agree with the discharge instructions.\par

## 2021-11-26 NOTE — ASSESSMENT
[FreeTextEntry1] : Mr.s Gregg is a 57 y/o female with a pmhx of 10 pack year smoker, cholesterol, migraines, TMJ, anxiety, asthma, allergies, low vitamin D, OW, Mil ; here for an evaluation of breathing  /  SOB - DULCE MARIA (mild) - asthma / allergy - stable., OW - active asthma \par \par Problem one: SOB is multifactorial \par -over weight/ out of shape\par -poor breathing mechanics\par -asthma \par -?Cardiac disease\par -anemia\par \par Problem 1: GERD\par -continue Protonix 40 mg before breakfast \par -continue Pepcid 40 mg QHS\par -Rule of 2s: avoid eating too much, eating too fast, eating too late, eating too spicy, eating too lousy, eating two hours before bed.\par -Things to avoid including overeating, spicy foods, tight clothing, eating within three hours of bed, this list is not all inclusive. \par -For treatment of reflux, possible options discussed including diet control, H2 blockers, PPIs, as well as coating motility agents discussed as treatment options. Timing of meals and proximity of last meal to sleep were discussed. If symptoms persist, a formal gastrointestinal evaluation is needed.\par \par Problem 2: poor breathing mechanics \par - recommended Buteyko / Garry Hof breathing methods \par -Proper breathing techniques were reviewed with an emphasis of exhalation. Patient instructed to breath in for 1 second and out for four seconds. Patient was encouraged to not talk while walking. \par \par Problem 3: over weight \par -Recommend "Muniq" OTC Supplement for weight loss, energy levels, and blood sugar levels \par  -Weight loss, exercise, and diet control were discussed and are highly encouraged. Treatment options were given such as, aqua therapy, and contacting a nutritionist. Recommended to use the elliptical, stationary bike, less use of treadmill.  Obesity is associated with worsening asthma, shortness of breath, and potential for cardiac disease, diabetes, and other underlying medical conditions. \par \par Problem 4: Asthma\par -continue Breo 1 inhalation QD (200)\par -Xopenex PRN for rescue \par -continue Singulair 10 mg before bed \par  -Asthma is  believed to be caused by inherited (genetic) and environmental factor, but its exact cause is unknown. Asthma may be triggered by allergens, lung infections, or irritants in the air. Asthma triggers are different for each person \par \par Problem 5: Allergies (active) \par - continue Flonase 1 sniff BID \par - continue Olopatadine (.6) 1 sniff BID \par -Add Clarinex 5 mg before bed \par - Environmental measures for allergies were encouraged including mattress and pillow cover, air purifier, and environmental controls. \par \par Problem 6: (+) OSAS (likely fibromyalgia related to sleep)- noncompliant \par - S/p HSS - c/w mild sleep apnea (over 5 years ago)\par - Recommend Dental Device - [place] \par - recommended Slumber Bump, OxyAide, Sleeprite \par - Discussed the risks/associations with coronary artery disease, atrial fibrillation, arrhythmia, memory loss, issues with concentration, stroke risk, hypertension, nocturia, chronic reflux/Ramsey’s esophagus some but not all inclusive. Treatment options discussed including CPAP/BiPAP machine, oral appliance, ProVent therapy, Oxy-Aid by [employer], new technologies, or positional sleep. \par -recommended sleep guard QD at bedtime \par -Good sleep hygiene was encouraged including avoiding watching television an hour before bed, keeping caffeine at a low,  avoiding reading, television, or anything, in bed, no drinking any liquids three hours before bedtime, and only getting into bed when tired and ready for sleep. \par - Sleep apnea is associated with adverse clinical consequences which an affect most organ systems. Cardiovascular disease risk includes arrhythmias, atrial fibrillation, hypertension, coronary artery disease, and stroke. Metabolic disorders include diabetes type 2, non-alcoholic fatty liver disease. Mood disorder especially depression; and cognitive decline especially in the elderly. Associations with chronic reflux/Ramsey’s esophagus some but not all inclusive. \par -Reasons include arousal consistent with hypopnea; respiratory events most prominent in REM sleep or supine position; therefore sleep staging and body position are important for accurate diagnosis and estimation of AHI.\par \par Problem 5: health maintenance\par - s/p COVID-19 vaccine x2 (Moderna) \par -s/p influenza vaccine 2020\par -recommended strep pneumonia vaccines after age 65: Prevnar-13 vaccine, followed by Pneumo vaccine 23 one year following\par -recommended early intervention for URIs\par -recommended regular osteoporosis evaluations\par -recommended early dermatological evaluations\par -recommended after the age of 50 to the age of 70, colonoscopy every 5 years \par \par F/u in 3-4 months  \par pt is encouraged to call or fax the office with any questions or concerns. \par Pt is to exercise and diet to promote a healthy weight. \par Explained to the pt in full detail with demonstrations how to use the inhalers and inhaler hygiene. \par

## 2021-11-26 NOTE — PROCEDURE
[FreeTextEntry1] : Feno was 17; a normal value being less than 25. Fractional exhaled nitric oxide (FENO) is regarded as a simple, noninvasive method for assessing eosinophilic airway inflammation. Produced by a variety of cells within the lung, nitric oxide (NO) concentrations are generally low in healthy individuals. However, high concentrations of NO appear to be involved in nonspecific host defense mechanisms and chronic inflammatory  diseases such as asthma. The American Thoracic Society (ATS) therefore recommended using FENO to aid in the diagnosis and monitoring of eosinophilic airway inflammation and asthma, and for identifying steroid responsive individuals whose chronic respiratory symptoms may be caused by airway inflammation \par \par PFT revealed normal flows, with a FEV1 of 2.90L, which is 122% of predicted, with a normal flow volume loop\par

## 2021-11-30 ENCOUNTER — NON-APPOINTMENT (OUTPATIENT)
Age: 56
End: 2021-11-30

## 2021-11-30 RX ORDER — ALBUTEROL SULFATE 90 UG/1
108 (90 BASE) INHALANT RESPIRATORY (INHALATION)
Qty: 3 | Refills: 1 | Status: ACTIVE | COMMUNITY
Start: 2021-11-30 | End: 1900-01-01

## 2021-11-30 RX ORDER — LEVALBUTEROL TARTRATE 45 UG/1
45 AEROSOL, METERED ORAL
Qty: 3 | Refills: 2 | Status: DISCONTINUED | COMMUNITY
Start: 2017-08-07 | End: 2021-11-30

## 2021-12-19 LAB — SARS-COV-2 N GENE NPH QL NAA+PROBE: NOT DETECTED

## 2021-12-21 ENCOUNTER — NON-APPOINTMENT (OUTPATIENT)
Age: 56
End: 2021-12-21

## 2021-12-27 ENCOUNTER — TRANSCRIPTION ENCOUNTER (OUTPATIENT)
Age: 56
End: 2021-12-27

## 2021-12-27 ENCOUNTER — APPOINTMENT (OUTPATIENT)
Dept: PULMONOLOGY | Facility: CLINIC | Age: 56
End: 2021-12-27
Payer: COMMERCIAL

## 2021-12-27 LAB — SARS-COV-2 N GENE NPH QL NAA+PROBE: DETECTED

## 2021-12-27 PROCEDURE — 99214 OFFICE O/P EST MOD 30 MIN: CPT | Mod: 95

## 2021-12-27 RX ORDER — PREDNISONE 10 MG/1
10 TABLET ORAL
Qty: 50 | Refills: 0 | Status: ACTIVE | COMMUNITY
Start: 2021-12-27 | End: 1900-01-01

## 2021-12-27 NOTE — REASON FOR VISIT
[Follow-Up] : a follow-up visit [FreeTextEntry1] : via video call - asthma and chronic bronchitis, SOB, chest pain

## 2021-12-27 NOTE — ADDENDUM
[FreeTextEntry1] : Documented by Birdie Bridges acting as a scribe for Dr. Santiago Denton on 12/27/2021 \par \par All medical record entries made by the Scribe were at my, Dr. Santiago Denton's, direction and personally dictated by me on 12/27/2021 . I have reviewed the chart and agree that the record accurately reflects my personal performance of the history, physical exam, assessment and plan. I have also personally directed, reviewed, and agree with the discharge instructions.

## 2021-12-27 NOTE — ASSESSMENT
[FreeTextEntry1] : Mr.s Gregg is a 55 y/o female with a pmhx of 10 pack year smoker, cholesterol, migraines, TMJ, anxiety, asthma, allergies, low vitamin D, OW, Mil ; here for an evaluation via video call  of breathing  /  SOB - DULCE MARIA (mild) - asthma / allergy - stable., OW - active asthma due to COVID-19 infection \par \par Problem one: SOB is multifactorial \par -over weight/ out of shape\par -poor breathing mechanics\par -asthma \par -?Cardiac disease\par -anemia\par \par Problem 1A: COVID-19 infection \par - no MAB / Rem \par - initiate -add Prednisone 20 mg x 7 days, 10 mg x 7 days \par Information sheet given to the patient to be reviewed, this medication is never to be used without consulting the prescribing physician. Proper dietary restraint is necessary specifically salt containing foods, if any reaction may occur should be reported.\par - recommended Becka-[place]ison cold & flu \par \par Problem 1: GERD\par -continue Protonix 40 mg before breakfast \par -continue Pepcid 40 mg QHS\par -Rule of 2s: avoid eating too much, eating too fast, eating too late, eating too spicy, eating too lousy, eating two hours before bed.\par -Things to avoid including overeating, spicy foods, tight clothing, eating within three hours of bed, this list is not all inclusive. \par -For treatment of reflux, possible options discussed including diet control, H2 blockers, PPIs, as well as coating motility agents discussed as treatment options. Timing of meals and proximity of last meal to sleep were discussed. If symptoms persist, a formal gastrointestinal evaluation is needed.\par \par Problem 2: poor breathing mechanics \par - recommended Buteyko / Wim Hof breathing methods \par -Proper breathing techniques were reviewed with an emphasis of exhalation. Patient instructed to breath in for 1 second and out for four seconds. Patient was encouraged to not talk while walking. \par \par Problem 3: over weight \par -Recommend "Muniq" OTC Supplement for weight loss, energy levels, and blood sugar levels \par  -Weight loss, exercise, and diet control were discussed and are highly encouraged. Treatment options were given such as, aqua therapy, and contacting a nutritionist. Recommended to use the elliptical, stationary bike, less use of treadmill.  Obesity is associated with worsening asthma, shortness of breath, and potential for cardiac disease, diabetes, and other underlying medical conditions. \par \par Problem 4: Asthma- active \par -continue Breo 1 inhalation QD (200)\par -Xopenex PRN for rescue \par -continue Singulair 10 mg before bed \par  -Asthma is  believed to be caused by inherited (genetic) and environmental factor, but its exact cause is unknown. Asthma may be triggered by allergens, lung infections, or irritants in the air. Asthma triggers are different for each person \par \par Problem 5: Allergies \par - continue Flonase 1 sniff BID \par - continue Olopatadine (.6) 1 sniff BID \par -Add Clarinex 5 mg before bed \par - Environmental measures for allergies were encouraged including mattress and pillow cover, air purifier, and environmental controls. \par \par Problem 6: (+) OSAS (likely fibromyalgia related to sleep)- noncompliant \par - S/p HSS - c/w mild sleep apnea (over 5 years ago)\par - Recommend Dental Device - [place] \par - recommended Slumber Bump, OxyAide, Sleeprite \par - Discussed the risks/associations with coronary artery disease, atrial fibrillation, arrhythmia, memory loss, issues with concentration, stroke risk, hypertension, nocturia, chronic reflux/Ramsey’s esophagus some but not all inclusive. Treatment options discussed including CPAP/BiPAP machine, oral appliance, ProVent therapy, Oxy-Aid by [employer]pite[employer], new technologies, or positional sleep. \par -recommended sleep guard QD at bedtime \par -Good sleep hygiene was encouraged including avoiding watching television an hour before bed, keeping caffeine at a low,  avoiding reading, television, or anything, in bed, no drinking any liquids three hours before bedtime, and only getting into bed when tired and ready for sleep. \par - Sleep apnea is associated with adverse clinical consequences which an affect most organ systems. Cardiovascular disease risk includes arrhythmias, atrial fibrillation, hypertension, coronary artery disease, and stroke. Metabolic disorders include diabetes type 2, non-alcoholic fatty liver disease. Mood disorder especially depression; and cognitive decline especially in the elderly. Associations with chronic reflux/Ramsey’s esophagus some but not all inclusive. \par -Reasons include arousal consistent with hypopnea; respiratory events most prominent in REM sleep or supine position; therefore sleep staging and body position are important for accurate diagnosis and estimation of AHI.\par \par Problem 5: health maintenance\par - s/p COVID-19 vaccine x2 (Moderna) \par -s/p influenza vaccine 2020\par -recommended strep pneumonia vaccines after age 65: Prevnar-13 vaccine, followed by Pneumo vaccine 23 one year following\par -recommended early intervention for URIs\par -recommended regular osteoporosis evaluations\par -recommended early dermatological evaluations\par -recommended after the age of 50 to the age of 70, colonoscopy every 5 years \par \par F/u in 3-4 months  \par pt is encouraged to call or fax the office with any questions or concerns. \par Pt is to exercise and diet to promote a healthy weight. \par Explained to the pt in full detail with demonstrations how to use the inhalers and inhaler hygiene. \par

## 2021-12-27 NOTE — HISTORY OF PRESENT ILLNESS
[Home] : at home, [unfilled] , at the time of the visit. [Medical Office: ([facility])___] : at the medical office located in  [Verbal consent obtained from patient] : the patient, [unfilled] [FreeTextEntry1] : Meera Gregg is a 55 y/o female with a history of allergies, asthma, GERD, DULCE MARIA, overweight, poor sleep, snoring, and SOB presenting to the office today via video call  for a follow up visit. Her chief complaint is\par - she notes she is covid positive \par - she notes she gets chills, headache, sore throat, these symptoms started a week ago \par - no heart burn / reflux \par - no sour taste in the mouth \par - She notes bowels are regular \par - she has been having SOB \par - she has been coughing, but its a dry hacking cough-nothing is really coming up. \par - She  denies any visual issues, headaches, nausea, vomiting, fever, chills, sweats, chest pains, chest pressure, diarrhea, constipation, dysphagia, myalgia, dizziness, leg swelling, leg pain, itchy eyes, itchy ears, heartburn, reflux, or sour taste in the mouth.\par

## 2022-02-22 NOTE — ED PEDIATRIC TRIAGE NOTE - NS ED NURSE AMBULANCES
Post Op Day 1 Anesthesia Post-op Note    Patient: Dave Hansen  Procedure(s) Performed:   Anesthesia type: General    Patient location: ICU    Vital Last Value   Temperature 36.7 Â°C (98 Â°F) (02/22/22 1200)   Pulse 72 (02/22/22 1400)   Respiratory Rate 20 (02/22/22 1400)   Non-Invasive   Blood Pressure 120/67 (02/22/22 1400)   Arterial  Blood Pressure     Pulse Oximetry 95 % (02/22/22 1400)     Last 24 I/O:   Intake/Output Summary (Last 24 hours) at 2/22/2022 1439  Last data filed at 2/22/2022 0800  Gross per 24 hour   Intake 1323.45 ml   Output 1900 ml   Net -576.55 ml     Post-op vital signs: stable  Level of consciousness: participates in exam and answers questions appropriately    Post-op pain: Adequate analgesia  Nausea: None  Airway patency: patent  Respiratory: unassisted  Cardiovascular: stable and blood pressure at baseline  Hydration: euvolemic    Post-op assessment: sufficiently recovered from acute administration of anesthesia effects and able to participate in evaluation, no apparent anesthetic complications, tolerated procedure well, and no evidence of recall  Complications: None.     Comments: [employer]

## 2022-03-14 RX ORDER — MONTELUKAST 10 MG/1
10 TABLET, FILM COATED ORAL
Qty: 1 | Refills: 1 | Status: ACTIVE | COMMUNITY
Start: 2020-09-15 | End: 1900-01-01

## 2022-03-14 RX ORDER — DESLORATADINE 5 MG/1
5 TABLET ORAL
Qty: 90 | Refills: 1 | Status: ACTIVE | COMMUNITY
Start: 2020-09-15 | End: 1900-01-01

## 2022-05-24 ENCOUNTER — APPOINTMENT (OUTPATIENT)
Dept: PULMONOLOGY | Facility: CLINIC | Age: 57
End: 2022-05-24

## 2023-03-20 ENCOUNTER — APPOINTMENT (OUTPATIENT)
Dept: PULMONOLOGY | Facility: CLINIC | Age: 58
End: 2023-03-20
Payer: COMMERCIAL

## 2023-03-20 ENCOUNTER — NON-APPOINTMENT (OUTPATIENT)
Age: 58
End: 2023-03-20

## 2023-03-20 VITALS
HEART RATE: 70 BPM | SYSTOLIC BLOOD PRESSURE: 110 MMHG | HEIGHT: 61 IN | RESPIRATION RATE: 16 BRPM | TEMPERATURE: 97.2 F | DIASTOLIC BLOOD PRESSURE: 78 MMHG | BODY MASS INDEX: 31.72 KG/M2 | WEIGHT: 168 LBS | OXYGEN SATURATION: 98 %

## 2023-03-20 DIAGNOSIS — J45.909 UNSPECIFIED ASTHMA, UNCOMPLICATED: ICD-10-CM

## 2023-03-20 DIAGNOSIS — U07.1 COVID-19: ICD-10-CM

## 2023-03-20 DIAGNOSIS — R06.83 SNORING: ICD-10-CM

## 2023-03-20 PROCEDURE — 99214 OFFICE O/P EST MOD 30 MIN: CPT | Mod: 25

## 2023-03-20 PROCEDURE — 94010 BREATHING CAPACITY TEST: CPT

## 2023-03-20 RX ORDER — FLUTICASONE FUROATE AND VILANTEROL TRIFENATATE 200; 25 UG/1; UG/1
200-25 POWDER RESPIRATORY (INHALATION)
Qty: 3 | Refills: 1 | Status: ACTIVE | COMMUNITY
Start: 2020-03-10 | End: 1900-01-01

## 2023-03-20 RX ORDER — ALBUTEROL SULFATE 90 UG/1
108 (90 BASE) AEROSOL, METERED RESPIRATORY (INHALATION) EVERY 6 HOURS
Qty: 3 | Refills: 1 | Status: ACTIVE | COMMUNITY
Start: 2020-09-15 | End: 1900-01-01

## 2023-03-20 NOTE — PROCEDURE
[FreeTextEntry1] : PFT reveals normal flows, with an FEV1 of  2.71 L, which is  115% of predicted, with normal flow volume loop \par \par \par feno; refused test, insurance doesn’t cover it

## 2023-03-20 NOTE — ADDENDUM
[FreeTextEntry1] : Documented by Birdie Bridges acting as a scribe for Dr. Santiago Denton on 03/20/2023 \par \par All medical record entries made by the Scribe were at my, Dr. Santiago Denton's, direction and personally dictated by me on 03/20/2023 . I have reviewed the chart and agree that the record accurately reflects my personal performance of the history, physical exam, assessment and plan. I have also personally directed, reviewed, and agree with the discharge instructions.

## 2023-03-20 NOTE — ASSESSMENT
[FreeTextEntry1] : Mr.s Gregg is a 56 y/o female with a pmhx of 10 pack year smoker, cholesterol, migraines, TMJ, anxiety, asthma, allergies, low vitamin D, OW, ; here for an evaluation for breathing  /  SOB - DULCE MARIA (mild) - asthma / allergy - stable., OW -untreated OSAS / GERD \par \par Problem one: SOB is multifactorial \par -over weight/ out of shape\par -poor breathing mechanics\par -asthma \par -?Cardiac disease\par -anemia\par \par \par Problem 1: GERD (Active)\par -continue Protonix 40 mg before breakfast \par -continue Pepcid 40 mg QHS\par -Rule of 2s: avoid eating too much, eating too fast, eating too late, eating too spicy, eating too lousy, eating two hours before bed.\par -Things to avoid including overeating, spicy foods, tight clothing, eating within three hours of bed, this list is not all inclusive. \par -For treatment of reflux, possible options discussed including diet control, H2 blockers, PPIs, as well as coating motility agents discussed as treatment options. Timing of meals and proximity of last meal to sleep were discussed. If symptoms persist, a formal gastrointestinal evaluation is needed.\par \par Problem 2: poor breathing mechanics \par - recommended Buteyko / Wim Hof breathing methods \par -Proper breathing techniques were reviewed with an emphasis of exhalation. Patient instructed to breath in for 1 second and out for four seconds. Patient was encouraged to not talk while walking. \par \par Problem 3: over weight (in progress) \par -Recommend "Muniq" OTC Supplement for weight loss, energy levels, and blood sugar levels \par  -Weight loss, exercise, and diet control were discussed and are highly encouraged. Treatment options were given such as, aqua therapy, and contacting a nutritionist. Recommended to use the elliptical, stationary bike, less use of treadmill.  Obesity is associated with worsening asthma, shortness of breath, and potential for cardiac disease, diabetes, and other underlying medical conditions. \par \par Problem 4: Asthma- \par -continue Breo 1 inhalation QD (200)\par -Xopenex PRN for rescue 2 puffs q6h PRN \par -continue Singulair 10 mg before bed \par  -Asthma is  believed to be caused by inherited (genetic) and environmental factor, but its exact cause is unknown. Asthma may be triggered by allergens, lung infections, or irritants in the air. Asthma triggers are different for each person \par \par Problem 5: Allergies (stable)\par - continue Flonase 1 sniff BID \par - continue Olopatadine (.6) 1 sniff BID \par -Add Clarinex 5 mg before bed \par - Environmental measures for allergies were encouraged including mattress and pillow cover, air purifier, and environmental controls. \par \par Problem 6: (+) OSAS (likely fibromyalgia related to sleep)- noncompliant \par - S/p HSS - c/w mild sleep apnea (over 5 years ago)\par - Recommend Dental Device - [place] \par - recommended Slumber Bump, OxyAide, Sleeprite \par - Discussed the risks/associations with coronary artery disease, atrial fibrillation, arrhythmia, memory loss, issues with concentration, stroke risk, hypertension, nocturia, chronic reflux/Ramsey’s esophagus some but not all inclusive. Treatment options discussed including CPAP/BiPAP machine, oral appliance, ProVent therapy, Oxy-Aid by [employer]pite[employer], new technologies, or positional sleep. \par -recommended sleep guard QD at bedtime \par -Good sleep hygiene was encouraged including avoiding watching television an hour before bed, keeping caffeine at a low,  avoiding reading, television, or anything, in bed, no drinking any liquids three hours before bedtime, and only getting into bed when tired and ready for sleep. \par - Sleep apnea is associated with adverse clinical consequences which an affect most organ systems. Cardiovascular disease risk includes arrhythmias, atrial fibrillation, hypertension, coronary artery disease, and stroke. Metabolic disorders include diabetes type 2, non-alcoholic fatty liver disease. Mood disorder especially depression; and cognitive decline especially in the elderly. Associations with chronic reflux/Ramsey’s esophagus some but not all inclusive. \par -Reasons include arousal consistent with hypopnea; respiratory events most prominent in REM sleep or supine position; therefore sleep staging and body position are important for accurate diagnosis and estimation of AHI.\par \par Problem 5: health maintenance\par - s/p COVID-19 vaccine x2 (Moderna) \par -s/p influenza vaccine 2022\par -recommended strep pneumonia vaccines after age 65: Prevnar-13 vaccine, followed by Pneumo vaccine 23 one year following\par -recommended early intervention for URIs\par -recommended regular osteoporosis evaluations\par -recommended early dermatological evaluations\par -recommended after the age of 50 to the age of 70, colonoscopy every 5 years \par \par F/u in 3-4 months  \par pt is encouraged to call or fax the office with any questions or concerns. \par Pt is to exercise and diet to promote a healthy weight. \par Explained to the pt in full detail with demonstrations how to use the inhalers and inhaler hygiene. \par

## 2023-03-20 NOTE — HISTORY OF PRESENT ILLNESS
[FreeTextEntry1] : Meera Gregg is a 58 y/o female with a history of allergies, asthma, GERD, DULCE MARIA, overweight, poor sleep, snoring, and SOB presenting to the office today for a follow up visit. Her chief complaint is\par - she notes she was home last week because she had vertigo. She couldn't’t get out of bed on Monday. T/W/THR/F she had a friend drive her to work. \par - right now she feels a little light headed\par - she notes her energy level has gone up again, Shes between 164-168lbs. \par - she notes she has been going through menopause\par - she notes she walks for exercise, walks her dog 3x a day 1/2 a mile, and she walks with a friend mile and a half everyday\par - she has not had any additional covid\par - she notes she got the most recent booster\par - she notes she sleeps the entire weekend due to feeling very exhausted\par - she notes shes exhausted all the time \par - She notes shes usually snoring by 8:30pm. \par - she notes she has been sneezing these past few days\par - she notes she has been using flonase OTC\par - she has not been wheezing\par - she notes her reflux has been active ; she ran out of medication and couldn’t get a refill until she came into the office. \par -She denies any visual issues, headaches, nausea, vomiting, fever, chills, sweats, chest pains, chest pressure, diarrhea, constipation, dysphagia, myalgia, dizziness, leg swelling, leg pain, itchy eyes, itchy ears.

## 2023-04-17 ENCOUNTER — EMERGENCY (EMERGENCY)
Facility: HOSPITAL | Age: 58
LOS: 1 days | Discharge: HOME | End: 2023-04-17
Attending: EMERGENCY MEDICINE | Admitting: EMERGENCY MEDICINE
Payer: COMMERCIAL

## 2023-04-17 VITALS
RESPIRATION RATE: 18 BRPM | OXYGEN SATURATION: 99 % | SYSTOLIC BLOOD PRESSURE: 124 MMHG | DIASTOLIC BLOOD PRESSURE: 79 MMHG | WEIGHT: 166.89 LBS | TEMPERATURE: 98 F | HEART RATE: 62 BPM | HEIGHT: 61 IN

## 2023-04-17 VITALS
TEMPERATURE: 98 F | HEART RATE: 60 BPM | SYSTOLIC BLOOD PRESSURE: 125 MMHG | OXYGEN SATURATION: 100 % | DIASTOLIC BLOOD PRESSURE: 84 MMHG | RESPIRATION RATE: 16 BRPM

## 2023-04-17 PROCEDURE — 99284 EMERGENCY DEPT VISIT MOD MDM: CPT | Mod: 25

## 2023-04-17 PROCEDURE — 93971 EXTREMITY STUDY: CPT

## 2023-04-17 PROCEDURE — 99284 EMERGENCY DEPT VISIT MOD MDM: CPT

## 2023-04-17 PROCEDURE — 93971 EXTREMITY STUDY: CPT | Mod: 26,LT

## 2023-04-17 NOTE — ED ADULT NURSE REASSESSMENT NOTE - NS ED NURSE REASSESS COMMENT FT1
pt reavaluated and vital signs stable d/c home to follow up SONO negative pt verbalizes understanding

## 2023-04-17 NOTE — ED ADULT TRIAGE NOTE - CHIEF COMPLAINT QUOTE
58 y/o female received ambulatory to triage. Alert and oriented x4. C/o "my left leg is swollen so urgent care sent me here." Pt with swelling to left leg. Reports 6/10 pain in left leg. Pt denies any cp, sob, n/v/d, dizziness, headache, fever/chills. Pt reports swelling started last night but pain in knee started last week. No redness noted. Cap refill <2 seconds. Denies any recent travel. Pt states "I have knee pain normally because I have cyst there."

## 2023-04-17 NOTE — ED ADULT NURSE NOTE - NSFALLRSKPASTHIST_ED_ALL_ED
C/c: poor wound healing.     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 She was prescribed outpt abx without improvement.   Hospitalist service consulted for medical comanagement/clearance. She has been on prednisone 20mg daily for about a month prior to admission. Given periop steroids.  she underwent I+D .     pt seen and examined this am. c/o diarrhea. no sob/chest pain. tolerating po.    ROS: all 10 systems reviewed and is as above otherwise negative.     Vital Signs Last 24 Hrs  T(C): 36.7 (2023 08:59), Max: 36.7 (2023 18:00)  T(F): 98 (2023 08:59), Max: 98.1 (2023 18:00)  HR: 78 (2023 08:59) (76 - 98)  BP: 122/67 (2023 08:59) (111/58 - 161/87)  BP(mean): 81 (2023 20:40) (81 - 81)  RR: 18 (2023 08:59) (12 - 18)  SpO2: 100% (2023 08:59) (97% - 100%)    Parameters below as of 2023 08:59  Patient On (Oxygen Delivery Method): room air    PHYSICAL EXAM:    GENERAL: Comfortable, no acute distress   HEAD:  Normocephalic, atraumatic  EYES: EOMI, PERRLA  HEENT: Moist mucous membranes  NECK: Supple, No JVD  NERVOUS SYSTEM:  Alert & Oriented X3, Motor Strength 5/5 B/L upper and lower extremities  CHEST/LUNG: Clear to auscultation bilaterally  HEART: Regular rate and rhythm  ABDOMEN: Soft, Nontender, Nondistended, Bowel sounds present  GENITOURINARY: Voiding, no palpable bladder  EXTREMITIES:   No clubbing, cyanosis, or edema  MUSCULOSKELTAL-back dressing +. +BETITO  SKIN-no rash  LABS:                        10.5   7.07  )-----------( 194      ( 2023 08:39 )             33.1     01-18    137  |  106  |  19  ----------------------------<  122<H>  3.9   |  25  |  0.50    Ca    8.8      2023 08:39      PT/INR - ( 2023 04:33 )   PT: 12.1 sec;   INR: 1.04 ratio         PTT - ( 2023 04:33 )  PTT:27.1 sec  Urinalysis Basic - ( 2023 04:30 )    Color: Yellow / Appearance: Clear / S.020 / pH: x  Gluc: x / Ketone: Negative  / Bili: Negative / Urobili: Negative   Blood: x / Protein: Negative / Nitrite: Negative   Leuk Esterase: Small / RBC: Negative /HPF / WBC 6-10 /HPF   Sq Epi: x / Non Sq Epi: Many / Bacteria: Few      MEDICATIONS  (STANDING):  acetaminophen     Tablet .. 650 milliGRAM(s) Oral every 6 hours  amLODIPine   Tablet 10 milliGRAM(s) Oral daily  cefTRIAXone Injectable. 2000 milliGRAM(s) IV Push every 24 hours  cyclobenzaprine 10 milliGRAM(s) Oral every 8 hours  FLUoxetine 40 milliGRAM(s) Oral daily  gabapentin 600 milliGRAM(s) Oral three times a day  hydrocortisone sodium succinate Injectable 25 milliGRAM(s) IV Push every 8 hours  influenza   Vaccine 0.5 milliLiter(s) IntraMuscular once  lactated ringers. 1000 milliLiter(s) (75 mL/Hr) IV Continuous <Continuous>  lactated ringers. 1000 milliLiter(s) (100 mL/Hr) IV Continuous <Continuous>  lactobacillus acidophilus 1 Tablet(s) Oral daily  lisinopril 10 milliGRAM(s) Oral every 12 hours  montelukast 10 milliGRAM(s) Oral at bedtime  multivitamin 1 Tablet(s) Oral daily  senna 2 Tablet(s) Oral at bedtime  vancomycin  IVPB 1000 milliGRAM(s) IV Intermittent every 12 hours    MEDICATIONS  (PRN):  acetaminophen     Tablet .. 650 milliGRAM(s) Oral every 6 hours PRN Temp greater or equal to 38.5C (101.3F), Mild Pain (1 - 3)  albuterol    90 MICROgram(s) HFA Inhaler 2 Puff(s) Inhalation every 6 hours PRN for shortness of breath and/or wheezing  clonazePAM  Tablet 0.5 milliGRAM(s) Oral two times a day PRN anxiety  diazepam    Tablet 5 milliGRAM(s) Oral every 12 hours PRN muscle spasm  diphenhydrAMINE Injectable 12.5 milliGRAM(s) IV Push every 4 hours PRN Itching  HYDROmorphone  Injectable 0.5 milliGRAM(s) IV Push every 6 hours PRN Severe Pain (7 - 10)  morphine  - Injectable 4 milliGRAM(s) IV Push every 3 hours PRN Severe Pain (7 - 10)  oxyCODONE    IR 10 milliGRAM(s) Oral every 4 hours PRN Moderate Pain (4 - 6)  oxyCODONE    IR 5 milliGRAM(s) Oral every 4 hours PRN Mild Pain (1 - 3)  oxyCODONE    IR 10 milliGRAM(s) Oral every 4 hours PRN Moderate Pain (4 - 6)    ASSESSMENT AND PLAN:  59F, PMH of HTN, anxiety, depression, lumbar stenosis, obesity, OA, RA presented to ED with poorly healing wound from recent lumbar lami/fusion performed on dec 2nd 2022. She was prescribed outpt abx without improvement.   Hospitalist service consulted for medical comanagement/clearance. She has been on prednisone 20mg daily for about a month prior to admission. Given periop steroids.  she underwent I+D .     1. Recent Lumbar lami/fusion complicated by post op infn:  -pt with h/o post op infns, likely related to underlying immunosuppression with RA/RA treatment.   -s/p I+D .   -f/u cultures  -monitor drain outpt.  -ID following.   -continue rocephin/vancomycin.   -physical therapy  -incentive spirometry.     2. Acute blood loss anemia:  -d/t surgery  -no need for transfusion at this time.     3. H/o RA:  -has been on prednisone for at least a month @ 20mg daily.   -s/p iv hydrocortisone 50mg on call to OR  -iv hydrocortisone 25mg Q8H X 3 today, then resume usual dose.    4. HTN:  -continue lisinopril.     5. Asthma:  -prn albuterol.   -singulair.     6. Anxiety/depression:  -continue fluoxetine, prn klonipin.     7. DVT px:  -per primary team.                unable to determine

## 2023-04-17 NOTE — ED PROVIDER NOTE - OBJECTIVE STATEMENT
57-year-old female sent in from urgent care for removal left lower extremity pain and swelling.  Patient states that she typically has left knee pain but now has left calf pain.  Patient denies any obvious trauma.  Patient was seen in the urgent care was told to follow-up in the ED for ultrasound to rule out DVT.

## 2023-04-17 NOTE — ED ADULT NURSE NOTE - CHIEF COMPLAINT QUOTE
56 y/o female received ambulatory to triage. Alert and oriented x4. C/o "my left leg is swollen so urgent care sent me here." Pt with swelling to left leg. Reports 6/10 pain in left leg. Pt denies any cp, sob, n/v/d, dizziness, headache, fever/chills. Pt reports swelling started last night but pain in knee started last week. No redness noted. Cap refill <2 seconds. Denies any recent travel. Pt states "I have knee pain normally because I have cyst there."

## 2023-04-17 NOTE — ED ADULT TRIAGE NOTE - MEANS OF ARRIVAL
Problem: Patient Care Overview  Goal: Plan of Care Review  Outcome: Ongoing (interventions implemented as appropriate)  Flowsheets  Taken 3/1/2020 0453  Progress: improving  Taken 3/2/2020 0400  Plan of Care Reviewed With: patient  Taken 3/2/2020 0624  Outcome Summary: Pt remains on CCU. Had to up the O2 to 9L's for a little while when he first fell asleep, but was able to wean it back down to 7L while maintaining in the low 90's. Pt slept most of the night with no issues. Heparin gtt changed to 22 units/kg/hr, down from 24 units/kg/hr. VSS. CTM.      ambulatory

## 2023-04-17 NOTE — ED PROVIDER NOTE - NS ED MD DISPO DISCHARGE

## 2023-04-17 NOTE — ED PROVIDER NOTE - PATIENT PORTAL LINK FT
You can access the FollowMyHealth Patient Portal offered by [facility] by registering at the following website: [email or web address]. By joining [employer]’s FollowMyHealth portal, you will also be able to view your health information using other applications (apps) compatible with our system.

## 2023-04-17 NOTE — ED PROVIDER NOTE - CLINICAL SUMMARY MEDICAL DECISION MAKING FREE TEXT BOX
57-year-old female with 1 day of left lower extremity pain and swelling.  Will check ultrasound rule out DVT.

## 2023-06-19 ENCOUNTER — APPOINTMENT (OUTPATIENT)
Dept: PULMONOLOGY | Facility: CLINIC | Age: 58
End: 2023-06-19

## 2023-07-28 RX ORDER — FLUTICASONE FUROATE AND VILANTEROL TRIFENATATE 200; 25 UG/1; UG/1
200-25 POWDER RESPIRATORY (INHALATION)
Qty: 3 | Refills: 0 | Status: ACTIVE | COMMUNITY
Start: 2017-08-07 | End: 1900-01-01

## 2023-08-17 ENCOUNTER — EMERGENCY (EMERGENCY)
Facility: HOSPITAL | Age: 58
LOS: 1 days | Discharge: HOME | End: 2023-08-17
Attending: EMERGENCY MEDICINE
Payer: COMMERCIAL

## 2023-08-17 VITALS
SYSTOLIC BLOOD PRESSURE: 130 MMHG | HEIGHT: 61 IN | OXYGEN SATURATION: 99 % | RESPIRATION RATE: 18 BRPM | WEIGHT: 166.01 LBS | HEART RATE: 72 BPM | DIASTOLIC BLOOD PRESSURE: 86 MMHG

## 2023-08-17 LAB
ALBUMIN SERPL ELPH-MCNC: 4.7 G/DL — SIGNIFICANT CHANGE UP (ref 3.3–5)
ALP SERPL-CCNC: 95 U/L — SIGNIFICANT CHANGE UP (ref 40–120)
ALT FLD-CCNC: 20 U/L — SIGNIFICANT CHANGE UP (ref 10–45)
ANION GAP SERPL CALC-SCNC: 12 MMOL/L — SIGNIFICANT CHANGE UP (ref 5–17)
APTT BLD: 35.6 SEC — SIGNIFICANT CHANGE UP (ref 24.5–35.6)
AST SERPL-CCNC: 17 U/L — SIGNIFICANT CHANGE UP (ref 10–40)
BASOPHILS # BLD AUTO: 0.08 K/UL — SIGNIFICANT CHANGE UP (ref 0–0.2)
BASOPHILS NFR BLD AUTO: 0.9 % — SIGNIFICANT CHANGE UP (ref 0–2)
BILIRUB SERPL-MCNC: 0.4 MG/DL — SIGNIFICANT CHANGE UP (ref 0.2–1.2)
BUN SERPL-MCNC: 15 MG/DL — SIGNIFICANT CHANGE UP (ref 7–23)
CALCIUM SERPL-MCNC: 9.9 MG/DL — SIGNIFICANT CHANGE UP (ref 8.4–10.5)
CHLORIDE SERPL-SCNC: 102 MMOL/L — SIGNIFICANT CHANGE UP (ref 96–108)
CO2 SERPL-SCNC: 26 MMOL/L — SIGNIFICANT CHANGE UP (ref 22–31)
CREAT SERPL-MCNC: 0.74 MG/DL — SIGNIFICANT CHANGE UP (ref 0.5–1.3)
EGFR: 94 ML/MIN/1.73M2 — SIGNIFICANT CHANGE UP
EOSINOPHIL # BLD AUTO: 0.09 K/UL — SIGNIFICANT CHANGE UP (ref 0–0.5)
EOSINOPHIL NFR BLD AUTO: 1 % — SIGNIFICANT CHANGE UP (ref 0–6)
GLUCOSE SERPL-MCNC: 92 MG/DL — SIGNIFICANT CHANGE UP (ref 70–99)
HCT VFR BLD CALC: 41.9 % — SIGNIFICANT CHANGE UP (ref 34.5–45)
HGB BLD-MCNC: 13.8 G/DL — SIGNIFICANT CHANGE UP (ref 11.5–15.5)
IMM GRANULOCYTES NFR BLD AUTO: 0.2 % — SIGNIFICANT CHANGE UP (ref 0–0.9)
INR BLD: 1.01 RATIO — SIGNIFICANT CHANGE UP (ref 0.85–1.18)
LIDOCAIN IGE QN: 26 U/L — SIGNIFICANT CHANGE UP (ref 7–60)
LYMPHOCYTES # BLD AUTO: 2.14 K/UL — SIGNIFICANT CHANGE UP (ref 1–3.3)
LYMPHOCYTES # BLD AUTO: 23.7 % — SIGNIFICANT CHANGE UP (ref 13–44)
MAGNESIUM SERPL-MCNC: 2.4 MG/DL — SIGNIFICANT CHANGE UP (ref 1.6–2.6)
MCHC RBC-ENTMCNC: 29.2 PG — SIGNIFICANT CHANGE UP (ref 27–34)
MCHC RBC-ENTMCNC: 32.9 GM/DL — SIGNIFICANT CHANGE UP (ref 32–36)
MCV RBC AUTO: 88.8 FL — SIGNIFICANT CHANGE UP (ref 80–100)
MONOCYTES # BLD AUTO: 0.51 K/UL — SIGNIFICANT CHANGE UP (ref 0–0.9)
MONOCYTES NFR BLD AUTO: 5.7 % — SIGNIFICANT CHANGE UP (ref 2–14)
NEUTROPHILS # BLD AUTO: 6.18 K/UL — SIGNIFICANT CHANGE UP (ref 1.8–7.4)
NEUTROPHILS NFR BLD AUTO: 68.5 % — SIGNIFICANT CHANGE UP (ref 43–77)
NRBC # BLD: 0 /100 WBCS — SIGNIFICANT CHANGE UP (ref 0–0)
PHOSPHATE SERPL-MCNC: 3.9 MG/DL — SIGNIFICANT CHANGE UP (ref 2.5–4.5)
PLATELET # BLD AUTO: 242 K/UL — SIGNIFICANT CHANGE UP (ref 150–400)
POTASSIUM SERPL-MCNC: 4.3 MMOL/L — SIGNIFICANT CHANGE UP (ref 3.5–5.3)
POTASSIUM SERPL-SCNC: 4.3 MMOL/L — SIGNIFICANT CHANGE UP (ref 3.5–5.3)
PROT SERPL-MCNC: 7.4 G/DL — SIGNIFICANT CHANGE UP (ref 6–8.3)
PROTHROM AB SERPL-ACNC: 10.6 SEC — SIGNIFICANT CHANGE UP (ref 9.5–13)
RBC # BLD: 4.72 M/UL — SIGNIFICANT CHANGE UP (ref 3.8–5.2)
RBC # FLD: 12.4 % — SIGNIFICANT CHANGE UP (ref 10.3–14.5)
SODIUM SERPL-SCNC: 140 MMOL/L — SIGNIFICANT CHANGE UP (ref 135–145)
TROPONIN T, HIGH SENSITIVITY RESULT: <6 NG/L — SIGNIFICANT CHANGE UP (ref 0–51)
WBC # BLD: 9.02 K/UL — SIGNIFICANT CHANGE UP (ref 3.8–10.5)
WBC # FLD AUTO: 9.02 K/UL — SIGNIFICANT CHANGE UP (ref 3.8–10.5)

## 2023-08-17 PROCEDURE — 99285 EMERGENCY DEPT VISIT HI MDM: CPT

## 2023-08-17 PROCEDURE — 74177 CT ABD & PELVIS W/CONTRAST: CPT | Mod: 26,MA

## 2023-08-17 PROCEDURE — 71045 X-RAY EXAM CHEST 1 VIEW: CPT | Mod: 26

## 2023-08-17 RX ORDER — ACETAMINOPHEN 500 MG
1000 TABLET ORAL ONCE
Refills: 0 | Status: COMPLETED | OUTPATIENT
Start: 2023-08-17 | End: 2023-08-17

## 2023-08-17 RX ORDER — ONDANSETRON 8 MG/1
4 TABLET, FILM COATED ORAL ONCE
Refills: 0 | Status: COMPLETED | OUTPATIENT
Start: 2023-08-17 | End: 2023-08-17

## 2023-08-17 RX ORDER — FAMOTIDINE 10 MG/ML
20 INJECTION INTRAVENOUS DAILY
Refills: 0 | Status: DISCONTINUED | OUTPATIENT
Start: 2023-08-17 | End: 2023-08-21

## 2023-08-17 RX ADMIN — Medication 400 MILLIGRAM(S): at 21:36

## 2023-08-17 RX ADMIN — ONDANSETRON 4 MILLIGRAM(S): 8 TABLET, FILM COATED ORAL at 21:34

## 2023-08-17 RX ADMIN — Medication 1000 MILLIGRAM(S): at 22:31

## 2023-08-17 RX ADMIN — Medication 30 MILLILITER(S): at 18:23

## 2023-08-17 RX ADMIN — FAMOTIDINE 20 MILLIGRAM(S): 10 INJECTION INTRAVENOUS at 18:22

## 2023-08-17 NOTE — ED PROVIDER NOTE - PHYSICAL EXAMINATION
Gen: NAD, non-toxic appearing  Head: normal appearing  HEENT: normal conjunctiva  Lung: no respiratory distress, speaking in full sentences, ctab      CV: regular rate and rhythm, no murmurs  Abd: soft, non distended,  left lower quadrant tenderness, greater than left upper and right lower quadrant tenderness.  MSK: no visible deformities  Neuro: alert and grossly oriented, no gross motor deficits  Skin: No jacinda rashes

## 2023-08-17 NOTE — ED PROVIDER NOTE - RAPID ASSESSMENT
57-year-old female presents emerged department complaining of 3 to 4 weeks of abdominal pain.  Patient reports that she has had pain in the left upper quadrant, epigastric area, right upper chest and mid back.  She reports that the pain has occurred intermittently in nature most severe in the left upper quadrant.  She states that pain is worsened with eating and drinking.  She has seen her GI and had a ultrasound of her abdomen performed yesterday but did not for the results.  Came to the emergency department his pain has worsened.  She feels improved with leaning forward.  Tried Gas-X at home without relief.  She has never had similar in the past. She denies fevers, chills, vomiting, diarrhea.    Rapid assessment by Terese Matos PA-C full eval to be performed in ED

## 2023-08-17 NOTE — ED PROVIDER NOTE - PATIENT PORTAL LINK FT
You can access the FollowMyHealth Patient Portal offered by [facility] by registering at the following website: [email or web address]. By joining [employer]’s FollowMyHealth portal, you will also be able to view your health information using other applications (apps) compatible with our system.

## 2023-08-17 NOTE — ED PROVIDER NOTE - NSFOLLOWUPINSTRUCTIONS_ED_ALL_ED_FT
Follow up with your Gastroenterology Doctor. You will be called in 24-48 hours to make the appointment. Call the Emergency Department if you have difficulties getting your appointment. The phone number can be found on the upper left hand side of this paperwork.     Immediately return to the Emergency Department for any new or markedly worsening symptoms.

## 2023-08-17 NOTE — ED ADULT NURSE REASSESSMENT NOTE - NS ED NURSE REASSESS COMMENT FT1
Report received from break coverage RN [place]. Pt is A&Ox3 and resting in stretcher. Pt endorsing 7/10 pain. Pt to receive Ofirimev and Zofran. PT denies any CP, SOB, fever, chills, or diarrhea. Safety and comfort measures in place bed in lowest position, siderails upright and blanket given,

## 2023-08-17 NOTE — ED ADULT NURSE NOTE - OBJECTIVE STATEMENT
57y Female PMHx HLD, GERD, and anxiety presenting to ED via walk-in triage for abd pain. Pt states she's had abd pain for several weeks now. Pt states she ate something, doesn't remember what she ate, but after that she had to run to the bathroom after everything she ate. Pt     Pain "right above cocyx in back like I have a black and blue and somebody is constantly poking at it.   Pt went to PCP who recommended she get a CT scan. 57y Female PMHx HLD, GERD, and anxiety presenting to ED via walk-in triage for abd pain. Pt states she's had abd pain for several weeks now. Pt states she ate something, doesn't remember what she ate, but after that she had to run to the bathroom after everything she ate. Pt endorses LLQ/LUQ/RLQ and epigastric burning/shooting and stabbing pain. Pt also endorsing pain "right above cocyx in back like I have a black and blue and somebody is constantly poking at it.   Pt went to PCP who recommended she get a CT scan. Pt A&Ox3, breathing spontaneously and unlabored on RA, pt is well-appearing, IV intact from triage, VSS. Stretcher in lowest position and locked.

## 2023-08-17 NOTE — ED PROVIDER NOTE - PROGRESS NOTE DETAILS
Isaiah Cox MD (PGY-3) ct abd/p negative. urine studies pending. pt expressing frustration regarding time spent in the ED. she is okay going home, urine studies pending, will be called back if studies come back positive. she has a GI doctor who I ask that she call tomorrow. She is agreeable w/ this plan.

## 2023-08-17 NOTE — ED PROVIDER NOTE - OBJECTIVE STATEMENT
57-year-old female, history of hyperlipidemia, anxiety, presenting with chief complaint of abdominal pain.  Ongoing for 3 weeks.  No remarkable preceding events.  Initially intermittent, dull and constant.  Radiates to her back.  No particular alleviating factors.  Worsened with food.  Review of systems otherwise positive for right-sided chest pain and shortness of breath.  No associated fevers, chills, hemoptysis, sepsis, vomiting.  She does feel nauseous.  Associated diarrhea, daily x2.  Seen by her PCP, referred here for further evaluation.  No allergies to medications.  No history of abdominal surgeries.  Takes aspirin, pressure and cholesterol pill.

## 2023-08-17 NOTE — ED PROVIDER NOTE - ATTENDING CONTRIBUTION TO CARE
Patient is a 57-year-old female with history of hyperlipidemia, Asthma, anxiety here for abdominal pain x3 weeks.  Patient reports left-sided abdominal pain that she feels is worse with food.  She reports nausea but no vomiting.  No fevers, chills.  She states that she does feel short of breath when she has pain. patient denies any recent travel.  Patient states that she has been having diarrhea daily as well.  patient denies any history of abdominal surgeries.  She denies any urinary symptoms.  At the time of my interview, she has no chest pain, shortness of breath.      VS noted  Gen. no acute distress, Non toxic   HEENT: EOMI, mmm  Lungs: CTAB/L no C/ W /R   CVS: RRR   Abd; Soft,  TTP to left abdomen, TTP to LLQ  with voluntary guarding, no CVA tenderness bilaterally  Ext: no edema  Skin: no rash  Neuro AAOx3 non focal clear speech  a/p:  left-sided abdominal pain–concern for diverticulitis, colitis, less likely cystitis.  Plan for labs, CT abdomen pelvis, urinalysis.  - Ricardo BERGER

## 2023-08-17 NOTE — ED PROVIDER NOTE - CHIEF COMPLAINT
The patient is a 57y Female complaining of  The patient is a 57y Female complaining of Abdominal pain

## 2023-08-18 VITALS
RESPIRATION RATE: 18 BRPM | OXYGEN SATURATION: 98 % | HEART RATE: 75 BPM | SYSTOLIC BLOOD PRESSURE: 127 MMHG | DIASTOLIC BLOOD PRESSURE: 83 MMHG | TEMPERATURE: 98 F

## 2023-08-18 LAB
APPEARANCE UR: CLEAR — SIGNIFICANT CHANGE UP
BACTERIA # UR AUTO: NEGATIVE — SIGNIFICANT CHANGE UP
BILIRUB UR-MCNC: NEGATIVE — SIGNIFICANT CHANGE UP
COLOR SPEC: SIGNIFICANT CHANGE UP
DIFF PNL FLD: NEGATIVE — SIGNIFICANT CHANGE UP
EPI CELLS # UR: 1 /HPF — SIGNIFICANT CHANGE UP
GLUCOSE UR QL: NEGATIVE — SIGNIFICANT CHANGE UP
HYALINE CASTS # UR AUTO: 4 /LPF — HIGH (ref 0–2)
KETONES UR-MCNC: NEGATIVE — SIGNIFICANT CHANGE UP
LEUKOCYTE ESTERASE UR-ACNC: ABNORMAL
NITRITE UR-MCNC: NEGATIVE — SIGNIFICANT CHANGE UP
PH UR: 6 — SIGNIFICANT CHANGE UP (ref 5–8)
PROT UR-MCNC: NEGATIVE — SIGNIFICANT CHANGE UP
RBC CASTS # UR COMP ASSIST: 1 /HPF — SIGNIFICANT CHANGE UP (ref 0–4)
SP GR SPEC: 1.02 — SIGNIFICANT CHANGE UP (ref 1.01–1.02)
UROBILINOGEN FLD QL: NEGATIVE — SIGNIFICANT CHANGE UP
WBC UR QL: 3 /HPF — SIGNIFICANT CHANGE UP (ref 0–5)

## 2023-08-18 PROCEDURE — 82962 GLUCOSE BLOOD TEST: CPT

## 2023-08-18 PROCEDURE — 85730 THROMBOPLASTIN TIME PARTIAL: CPT

## 2023-08-18 PROCEDURE — 84484 ASSAY OF TROPONIN QUANT: CPT

## 2023-08-18 PROCEDURE — 93005 ELECTROCARDIOGRAM TRACING: CPT

## 2023-08-18 PROCEDURE — 85610 PROTHROMBIN TIME: CPT

## 2023-08-18 PROCEDURE — 96375 TX/PRO/DX INJ NEW DRUG ADDON: CPT

## 2023-08-18 PROCEDURE — 83690 ASSAY OF LIPASE: CPT

## 2023-08-18 PROCEDURE — 80053 COMPREHEN METABOLIC PANEL: CPT

## 2023-08-18 PROCEDURE — 84100 ASSAY OF PHOSPHORUS: CPT

## 2023-08-18 PROCEDURE — 85025 COMPLETE CBC W/AUTO DIFF WBC: CPT

## 2023-08-18 PROCEDURE — 87086 URINE CULTURE/COLONY COUNT: CPT

## 2023-08-18 PROCEDURE — 83735 ASSAY OF MAGNESIUM: CPT

## 2023-08-18 PROCEDURE — 74177 CT ABD & PELVIS W/CONTRAST: CPT | Mod: MA

## 2023-08-18 PROCEDURE — 96374 THER/PROPH/DIAG INJ IV PUSH: CPT | Mod: XU

## 2023-08-18 PROCEDURE — 71045 X-RAY EXAM CHEST 1 VIEW: CPT

## 2023-08-18 PROCEDURE — 81001 URINALYSIS AUTO W/SCOPE: CPT

## 2023-08-18 PROCEDURE — 99285 EMERGENCY DEPT VISIT HI MDM: CPT | Mod: 25

## 2023-08-19 NOTE — ED POST DISCHARGE NOTE - DETAILS
8/19: LM on pts VM to call back admin line in regards to incidentals and need for additional imaging/follow up. Sushma Toure PA-C 8/19: Called pt back, she was very displeased that she wasn't notified while in ED of these findings. Apologized and reassured pt. Also upset that she did not have a follow up US in ED, explained to pt this was not emergent finding. Will f/u with her PMD, rushed me off the phone. Sushma Toure PA-C

## 2023-08-20 LAB
CULTURE RESULTS: SIGNIFICANT CHANGE UP
SPECIMEN SOURCE: SIGNIFICANT CHANGE UP

## 2023-08-30 NOTE — HISTORY OF PRESENT ILLNESS
[FreeTextEntry1] : Meera Gregg is a 58 y/o female with a history of allergies, asthma, GERD, DULCE MARIA, overweight, poor sleep, snoring, and SOB presenting to the office today for a follow up visit. Her chief complaint is   -she denies any headaches, nausea, emesis, fever, chills, sweats, chest pain, chest pressure, coughing, wheezing, palpitations, diarrhea, constipation, dysphagia, vertigo, arthralgias, myalgias, leg swelling, itchy eyes, itchy ears, heartburn, reflux, or sour taste in the mouth.

## 2023-08-30 NOTE — ADDENDUM
[FreeTextEntry1] : Documented by Abraham Wright acting as a scribe for Dr. Santiago Denton on 08/31/2023. All medical record entries made by the Scribe were at my, Dr. Santiago Denton's, direction and personally dictated by me on 08/31/2023. I have reviewed the chart and agree that the record accurately reflects my personal performance of the history, physical exam, assessment and plan. I have also personally directed, reviewed, and agree with the discharge instructions.

## 2023-08-30 NOTE — ASSESSMENT
[FreeTextEntry1] : Mr.s Gregg is a 56 y/o female with a pmhx of 10 pack year smoker, cholesterol, migraines, TMJ, anxiety, asthma, allergies, low vitamin D, OW, ; here for an evaluation for breathing  /  SOB - DULCE MARIA (mild) - asthma / allergy - stable., OW -untreated OSAS / GERD \par  \par  Problem one: SOB is multifactorial \par  -over weight/ out of shape\par  -poor breathing mechanics\par  -asthma \par  -?Cardiac disease\par  -anemia\par  \par  \par  Problem 1: GERD (Active)\par  -continue Protonix 40 mg before breakfast \par  -continue Pepcid 40 mg QHS\par  -Rule of 2s: avoid eating too much, eating too fast, eating too late, eating too spicy, eating too lousy, eating two hours before bed.\par  -Things to avoid including overeating, spicy foods, tight clothing, eating within three hours of bed, this list is not all inclusive. \par  -For treatment of reflux, possible options discussed including diet control, H2 blockers, PPIs, as well as coating motility agents discussed as treatment options. Timing of meals and proximity of last meal to sleep were discussed. If symptoms persist, a formal gastrointestinal evaluation is needed.\par  \par  Problem 2: poor breathing mechanics \par  - recommended Buteyko / Wim Hof breathing methods \par  -Proper breathing techniques were reviewed with an emphasis of exhalation. Patient instructed to breath in for 1 second and out for four seconds. Patient was encouraged to not talk while walking. \par  \par  Problem 3: over weight (in progress) \par  -Recommend "Muniq" OTC Supplement for weight loss, energy levels, and blood sugar levels \par   -Weight loss, exercise, and diet control were discussed and are highly encouraged. Treatment options were given such as, aqua therapy, and contacting a nutritionist. Recommended to use the elliptical, stationary bike, less use of treadmill.  Obesity is associated with worsening asthma, shortness of breath, and potential for cardiac disease, diabetes, and other underlying medical conditions. \par  \par  Problem 4: Asthma- \par  -continue Breo 1 inhalation QD (200)\par  -Xopenex PRN for rescue 2 puffs q6h PRN \par  -continue Singulair 10 mg before bed \par   -Asthma is  believed to be caused by inherited (genetic) and environmental factor, but its exact cause is unknown. Asthma may be triggered by allergens, lung infections, or irritants in the air. Asthma triggers are different for each person \par  \par  Problem 5: Allergies (stable)\par  - continue Flonase 1 sniff BID \par  - continue Olopatadine (.6) 1 sniff BID \par  -Add Clarinex 5 mg before bed \par  - Environmental measures for allergies were encouraged including mattress and pillow cover, air purifier, and environmental controls. \par  \par  Problem 6: (+) OSAS (likely fibromyalgia related to sleep)- noncompliant \par  - S/p HSS - c/w mild sleep apnea (over 5 years ago)\par  - Recommend Dental Device - [place] \par  - recommended Slumber Bump, OxyAide, Sleeprite \par  - Discussed the risks/associations with coronary artery disease, atrial fibrillation, arrhythmia, memory loss, issues with concentration, stroke risk, hypertension, nocturia, chronic reflux/Ramsey's esophagus some but not all inclusive. Treatment options discussed including CPAP/BiPAP machine, oral appliance, ProVent therapy, Oxy-Aid by Respitec, new technologies, or positional sleep. \par  -recommended sleep guard QD at bedtime \par  -Good sleep hygiene was encouraged including avoiding watching television an hour before bed, keeping caffeine at a low,  avoiding reading, television, or anything, in bed, no drinking any liquids three hours before bedtime, and only getting into bed when tired and ready for sleep. \par  - Sleep apnea is associated with adverse clinical consequences which an affect most organ systems. Cardiovascular disease risk includes arrhythmias, atrial fibrillation, hypertension, coronary artery disease, and stroke. Metabolic disorders include diabetes type 2, non-alcoholic fatty liver disease. Mood disorder especially depression; and cognitive decline especially in the elderly. Associations with chronic reflux/Ramsey's esophagus some but not all inclusive. \par  -Reasons include arousal consistent with hypopnea; respiratory events most prominent in REM sleep or supine position; therefore sleep staging and body position are important for accurate diagnosis and estimation of AHI.\par  \par  Problem 5: health maintenance\par  - s/p COVID-19 vaccine x2 (Moderna) \par  -s/p influenza vaccine 2022\par  -recommended strep pneumonia vaccines after age 65: Prevnar-13 vaccine, followed by Pneumo vaccine 23 one year following\par  -recommended early intervention for URIs\par  -recommended regular osteoporosis evaluations\par  -recommended early dermatological evaluations\par  -recommended after the age of 50 to the age of 70, colonoscopy every 5 years \par  \par  F/u in 3-4 months  \par  pt is encouraged to call or fax the office with any questions or concerns. \par  Pt is to exercise and diet to promote a healthy weight. \par  Explained to the pt in full detail with demonstrations how to use the inhalers and inhaler hygiene. \par

## 2023-08-31 ENCOUNTER — APPOINTMENT (OUTPATIENT)
Dept: PULMONOLOGY | Facility: CLINIC | Age: 58
End: 2023-08-31

## 2023-09-27 ENCOUNTER — APPOINTMENT (OUTPATIENT)
Dept: ORTHOPEDIC SURGERY | Facility: CLINIC | Age: 58
End: 2023-09-27
Payer: COMMERCIAL

## 2023-09-27 VITALS
WEIGHT: 166 LBS | SYSTOLIC BLOOD PRESSURE: 113 MMHG | HEART RATE: 66 BPM | DIASTOLIC BLOOD PRESSURE: 77 MMHG | BODY MASS INDEX: 31.34 KG/M2 | HEIGHT: 61 IN

## 2023-09-27 DIAGNOSIS — M75.41 IMPINGEMENT SYNDROME OF RIGHT SHOULDER: ICD-10-CM

## 2023-09-27 PROCEDURE — 99204 OFFICE O/P NEW MOD 45 MIN: CPT | Mod: 25

## 2023-09-27 PROCEDURE — 20610 DRAIN/INJ JOINT/BURSA W/O US: CPT | Mod: RT

## 2023-09-27 PROCEDURE — 73030 X-RAY EXAM OF SHOULDER: CPT | Mod: RT

## 2023-09-27 RX ORDER — CELECOXIB 200 MG/1
200 CAPSULE ORAL TWICE DAILY
Qty: 30 | Refills: 0 | Status: ACTIVE | COMMUNITY
Start: 2023-09-27 | End: 1900-01-01

## 2023-10-02 NOTE — ED PROVIDER NOTE - MUSCULOSKELETAL MINIMAL EXAM
+lt lumbar muscle spasm Siliq Counseling:  I discussed with the patient the risks of Siliq including but not limited to new or worsening depression, suicidal thoughts and behavior, immunosuppression, malignancy, posterior leukoencephalopathy syndrome, and serious infections.  The patient understands that monitoring is required including a PPD at baseline and must alert us or the primary physician if symptoms of infection or other concerning signs are noted. There is also a special program designed to monitor depression which is required with Siliq.

## 2023-10-04 ENCOUNTER — APPOINTMENT (OUTPATIENT)
Dept: ORTHOPEDIC SURGERY | Facility: CLINIC | Age: 58
End: 2023-10-04
Payer: COMMERCIAL

## 2023-10-04 VITALS — WEIGHT: 166 LBS | BODY MASS INDEX: 31.34 KG/M2 | HEIGHT: 61 IN

## 2023-10-04 DIAGNOSIS — S23.9XXA SPRAIN OF UNSPECIFIED PARTS OF THORAX, INITIAL ENCOUNTER: ICD-10-CM

## 2023-10-04 DIAGNOSIS — S13.9XXA SPRAIN OF JOINTS AND LIGAMENTS OF UNSPECIFIED PARTS OF NECK, INITIAL ENCOUNTER: ICD-10-CM

## 2023-10-04 PROCEDURE — 99214 OFFICE O/P EST MOD 30 MIN: CPT

## 2023-10-16 PROBLEM — S23.9XXA THORACIC SPRAIN: Status: ACTIVE | Noted: 2023-10-16

## 2023-10-16 PROBLEM — S13.9XXA NECK SPRAIN: Status: ACTIVE | Noted: 2023-10-16

## 2023-10-27 PROBLEM — M75.41 IMPINGEMENT SYNDROME OF RIGHT SHOULDER: Status: ACTIVE | Noted: 2023-10-27

## 2024-05-08 ENCOUNTER — APPOINTMENT (OUTPATIENT)
Dept: PULMONOLOGY | Facility: CLINIC | Age: 59
End: 2024-05-08
Payer: COMMERCIAL

## 2024-05-08 PROCEDURE — 99214 OFFICE O/P EST MOD 30 MIN: CPT

## 2024-05-08 RX ORDER — PANTOPRAZOLE 40 MG/1
40 TABLET, DELAYED RELEASE ORAL
Qty: 30 | Refills: 5 | Status: ACTIVE | COMMUNITY
Start: 2020-03-10 | End: 1900-01-01

## 2024-05-08 RX ORDER — ALBUTEROL SULFATE AND BUDESONIDE 90; 80 UG/1; UG/1
90-80 AEROSOL, METERED RESPIRATORY (INHALATION)
Qty: 1 | Refills: 2 | Status: ACTIVE | COMMUNITY
Start: 2024-05-08 | End: 1900-01-01

## 2024-05-08 RX ORDER — FAMOTIDINE 40 MG/1
40 TABLET, FILM COATED ORAL
Qty: 90 | Refills: 1 | Status: ACTIVE | COMMUNITY
Start: 2020-03-10 | End: 1900-01-01

## 2024-05-08 NOTE — HISTORY OF PRESENT ILLNESS
[TextBox_4] : Meera Gregg is a 56 y/o female with a history of allergies, asthma, GERD, DULCE MARIA, overweight, poor sleep, snoring, and SOB presenting to the office today for a follow up visit. Her chief complaint is requesting med renewals  -Recent hospitalization for chest pain at [facility] -CT chest left linglar atelectasis no pleural effusion, no cardiomegaly, pericardial effusion - pt will forward report to office - has been off pantoprazole/ famoridine for cough/ gerd and feels it is contributing to stomach pains -Denes SOB, nocturnal awakening, change in breathing off Breo -States she trialed Breo in the past but did not note any significant change in breathing -Last need for rescue inhaler in December     Mr.s Gregg is a 56 y/o female with a pmhx of 10 pack year smoker, cholesterol, migraines, TMJ, anxiety, asthma, allergies, low vitamin D, OW, ; here for an evaluation for breathing / SOB - DULCE MARIA (mild) - asthma / allergy - stable., OW -untreated OSAS / GERD    Problem one: SOB is multifactorial  -over weight/ out of shape  -poor breathing mechanics  -asthma  -?Cardiac disease  -anemia      Problem 1: GERD (Active)  -continue Protonix 40 mg before breakfast  -continue Pepcid 40 mg QHS  -Rule of 2s: avoid eating too much, eating too fast, eating too late, eating too spicy, eating too lousy, eating two hours before bed.  -Things to avoid including overeating, spicy foods, tight clothing, eating within three hours of bed, this list is not all inclusive.  -For treatment of reflux, possible options discussed including diet control, H2 blockers, PPIs, as well as coating motility agents discussed as treatment options. Timing of meals and proximity of last meal to sleep were discussed. If symptoms persist, a formal gastrointestinal evaluation is needed.    Problem 2: poor breathing mechanics  - recommended Shayan / Garry Albrecht breathing methods  -Proper breathing techniques were reviewed with an emphasis of exhalation. Patient instructed to breath in for 1 second and out for four seconds. Patient was encouraged to not talk while walking.    Problem 3: over weight (in progress)  -Recommend "Muniq" OTC Supplement for weight loss, energy levels, and blood sugar levels   -Weight loss, exercise, and diet control were discussed and are highly encouraged. Treatment options were given such as, aqua therapy, and contacting a nutritionist. Recommended to use the elliptical, stationary bike, less use of treadmill. Obesity is associated with worsening asthma, shortness of breath, and potential for cardiac disease, diabetes, and other underlying medical conditions.    Problem 4: Asthma-  -continue Breo 1 inhalation QD (200)  -Xopenex PRN for rescue 2 puffs q6h PRN  -continue Singulair 10 mg before bed   -Asthma is believed to be caused by inherited (genetic) and environmental factor, but its exact cause is unknown. Asthma may be triggered by allergens, lung infections, or irritants in the air. Asthma triggers are different for each person    Problem 5: Allergies (stable)  - continue Flonase 1 sniff BID  - continue Olopatadine (.6) 1 sniff BID  -Add Clarinex 5 mg before bed  - Environmental measures for allergies were encouraged including mattress and pillow cover, air purifier, and environmental controls.    Problem 6: (+) OSAS (likely fibromyalgia related to sleep)- noncompliant  - S/p HSS - c/w mild sleep apnea (over 5 years ago)  - Recommend Dental Device - [place]  - recommended Slumber Bump, OxyAide, Sleeprite  - Discussed the risks/associations with coronary artery disease, atrial fibrillation, arrhythmia, memory loss, issues with concentration, stroke risk, hypertension, nocturia, chronic reflux/Ramsey's esophagus some but not all inclusive. Treatment options discussed including CPAP/BiPAP machine, oral appliance, ProVent therapy, Oxy-Aid by Respitec, new technologies, or positional sleep.  -recommended sleep guard QD at bedtime  -Good sleep hygiene was encouraged including avoiding watching television an hour before bed, keeping caffeine at a low, avoiding reading, television, or anything, in bed, no drinking any liquids three hours before bedtime, and only getting into bed when tired and ready for sleep.  - Sleep apnea is associated with adverse clinical consequences which an affect most organ systems. Cardiovascular disease risk includes arrhythmias, atrial fibrillation, hypertension, coronary artery disease, and stroke. Metabolic disorders include diabetes type 2, non-alcoholic fatty liver disease. Mood disorder especially depression; and cognitive decline especially in the elderly. Associations with chronic reflux/Ramsey's esophagus some but not all inclusive.  -Reasons include arousal consistent with hypopnea; respiratory events most prominent in REM sleep or supine position; therefore sleep staging and body position are important for accurate diagnosis and estimation of AHI.    Problem 5: health maintenance  - s/p COVID-19 vaccine x2 (Moderna)  -s/p influenza vaccine 2022  -recommended strep pneumonia vaccines after age 65: Prevnar-13 vaccine, followed by Pneumo vaccine 23 one year following  -recommended early intervention for URIs  -recommended regular osteoporosis evaluations  -recommended early dermatological evaluations  -recommended after the age of 50 to the age of 70, colonoscopy every 5 years    F/u in 3-4 months  pt is encouraged to call or fax the office with any questions or concerns.  Pt is to exercise and diet to promote a healthy weight.  Explained to the pt in full detail with demonstrations how to use the inhalers and inhaler hygiene.

## 2024-05-08 NOTE — ASSESSMENT
[FreeTextEntry1] : Mr.s Gregg is a 56 y/o female with a pmhx of 10 pack year smoker, cholesterol, migraines, TMJ, anxiety, asthma, allergies, low vitamin D, OW, ; here for an evaluation for breathing / SOB - DULCE MARIA (mild) - asthma / allergy - stable., OW -untreated OSAS / GERD   Problem 1: asthma, mild -Change Ventolin to Airsupra (if covered/ formulary) for rescue - repeat PFTs / FENO in office  Problem 2: GERD (Active) -continue Protonix 40 mg before breakfast -continue Pepcid 40 mg QHS -GI follow up -Rule of 2s: avoid eating too much, eating too fast, eating too late, eating too spicy, eating too lousy, eating two hours before bed.  -Things to avoid including overeating, spicy foods, tight clothing, eating within three hours of bed, this list is not all inclusive.  -For treatment of reflux, possible options discussed including diet control, H2 blockers, PPIs, as well as coating motility agents discussed as treatment options. Timing of meals and proximity of last meal to sleep were discussed. If symptoms persist, a formal gastrointestinal evaluation is needed.  Problem 3. Abnormal CT / Lingular atelectasis -pt to forward full report, No f/u imaging at this time   F/u in2-3 months with PFT/FENO  pt is encouraged to call or fax the office with any questions or concerns.

## 2024-05-08 NOTE — REASON FOR VISIT
[Home] : at home, [unfilled] , at the time of the visit. [Medical Office: ([facility])___] : at the medical office located in  [Patient] : the patient [Follow-Up] : a follow-up visit

## 2024-06-13 ENCOUNTER — APPOINTMENT (OUTPATIENT)
Dept: PULMONOLOGY | Facility: CLINIC | Age: 59
End: 2024-06-13
Payer: COMMERCIAL

## 2024-06-13 VITALS
RESPIRATION RATE: 18 BRPM | OXYGEN SATURATION: 98 % | TEMPERATURE: 97.2 F | HEART RATE: 78 BPM | DIASTOLIC BLOOD PRESSURE: 80 MMHG | SYSTOLIC BLOOD PRESSURE: 122 MMHG

## 2024-06-13 DIAGNOSIS — S39.012A STRAIN OF MUSCLE, FASCIA AND TENDON OF LOWER BACK, INITIAL ENCOUNTER: ICD-10-CM

## 2024-06-13 DIAGNOSIS — R06.02 SHORTNESS OF BREATH: ICD-10-CM

## 2024-06-13 DIAGNOSIS — K21.9 GASTRO-ESOPHAGEAL REFLUX DISEASE W/OUT ESOPHAGITIS: ICD-10-CM

## 2024-06-13 DIAGNOSIS — G47.33 OBSTRUCTIVE SLEEP APNEA (ADULT) (PEDIATRIC): ICD-10-CM

## 2024-06-13 DIAGNOSIS — J30.9 ALLERGIC RHINITIS, UNSPECIFIED: ICD-10-CM

## 2024-06-13 DIAGNOSIS — Z72.820 SLEEP DEPRIVATION: ICD-10-CM

## 2024-06-13 DIAGNOSIS — J45.909 UNSPECIFIED ASTHMA, UNCOMPLICATED: ICD-10-CM

## 2024-06-13 DIAGNOSIS — E66.3 OVERWEIGHT: ICD-10-CM

## 2024-06-13 PROCEDURE — 99214 OFFICE O/P EST MOD 30 MIN: CPT | Mod: 25

## 2024-06-13 PROCEDURE — 94727 GAS DIL/WSHOT DETER LNG VOL: CPT

## 2024-06-13 PROCEDURE — ZZZZZ: CPT

## 2024-06-13 PROCEDURE — 94010 BREATHING CAPACITY TEST: CPT

## 2024-06-13 PROCEDURE — 94729 DIFFUSING CAPACITY: CPT

## 2024-06-13 NOTE — ASSESSMENT
[FreeTextEntry1] : Mr.s Gregg is a 56 y/o female with a pmhx of 10 pack year smoker, cholesterol, migraines, TMJ, anxiety, asthma, allergies, low vitamin D, OW, Mil ; SOB - DULCE MARIA (mild) - asthma / allergy - stable., OW - active asthma - untreated DULCE MARIA/symptomatic bloating   Problem one: SOB is multifactorial  -over weight/ out of shape -poor breathing mechanics -asthma  -?Cardiac disease -anemia  Problem 1: GERD (semi active)  -continue Protonix 40 mg before breakfast  -continue Pepcid 40 mg QHS -recommended DFH digestive enzyme  -Rule of 2s: avoid eating too much, eating too fast, eating too late, eating too spicy, eating too lousy, eating two hours before bed. -Things to avoid including overeating, spicy foods, tight clothing, eating within three hours of bed, this list is not all inclusive.  -For treatment of reflux, possible options discussed including diet control, H2 blockers, PPIs, as well as coating motility agents discussed as treatment options. Timing of meals and proximity of last meal to sleep were discussed. If symptoms persist, a formal gastrointestinal evaluation is needed.  Problem 2: poor breathing mechanics  - recommended Buteyko / Wison Hof breathing methods  -Proper breathing techniques were reviewed with an emphasis of exhalation. Patient instructed to breath in for 1 second and out for four seconds. Patient was encouraged to not talk while walking.   Problem 3: over weight  -recommended Berberine OTC  -Recommend "Muniq" OTC Supplement for weight loss, energy levels, and blood sugar levels   -Weight loss, exercise, and diet control were discussed and are highly encouraged. Treatment options were given such as, aqua therapy, and contacting a nutritionist. Recommended to use the elliptical, stationary bike, less use of treadmill.  Obesity is associated with worsening asthma, shortness of breath, and potential for cardiac disease, diabetes, and other underlying medical conditions.   Problem 4: Asthma -continue Breo 1 inhalation QD (200) -Xopenex PRN for rescue  -continue Singulair 10 mg before bed   -Asthma is  believed to be caused by inherited (genetic) and environmental factor, but its exact cause is unknown. Asthma may be triggered by allergens, lung infections, or irritants in the air. Asthma triggers are different for each person   Problem 5: Allergies (active)  - continue Flonase 1 sniff BID  - continue Olopatadine (.6) 1 sniff BID  -Add Clarinex 5 mg before bed  - Environmental measures for allergies were encouraged including mattress and pillow cover, air purifier, and environmental controls.   Problem 6: (+) OSAS (likely fibromyalgia related to sleep)- noncompliant  - S/p HSS - c/w mild sleep apnea (over 5 years ago) - Recommend Dental Device - Shay  - recommended Slumber Bump, OxyAide, Sleeprite  - Discussed the risks/associations with coronary artery disease, atrial fibrillation, arrhythmia, memory loss, issues with concentration, stroke risk, hypertension, nocturia, chronic reflux/Ramsey's esophagus some but not all inclusive. Treatment options discussed including CPAP/BiPAP machine, oral appliance, ProVent therapy, Oxy-Aid by Respitec, new technologies, or positional sleep.  -recommended sleep guard QD at bedtime  -Good sleep hygiene was encouraged including avoiding watching television an hour before bed, keeping caffeine at a low,  avoiding reading, television, or anything, in bed, no drinking any liquids three hours before bedtime, and only getting into bed when tired and ready for sleep.  - Sleep apnea is associated with adverse clinical consequences which an affect most organ systems. Cardiovascular disease risk includes arrhythmias, atrial fibrillation, hypertension, coronary artery disease, and stroke. Metabolic disorders include diabetes type 2, non-alcoholic fatty liver disease. Mood disorder especially depression; and cognitive decline especially in the elderly. Associations with chronic reflux/Ramsey's esophagus some but not all inclusive.  -Reasons include arousal consistent with hypopnea; respiratory events most prominent in REM sleep or supine position; therefore sleep staging and body position are important for accurate diagnosis and estimation of AHI.  Problem 5: health maintenance - s/p COVID-19 vaccine x2 (Moderna)  -s/p influenza vaccine 2023 -recommended strep pneumonia vaccines after age 65: Prevnar-13 vaccine, followed by Pneumo vaccine 23 one year following -recommended early intervention for URIs -recommended regular osteoporosis evaluations -recommended early dermatological evaluations -recommended after the age of 50 to the age of 70, colonoscopy every 5 years   F/u in 3-4 months   pt is encouraged to call or fax the office with any questions or concerns.  Pt is to exercise and diet to promote a healthy weight.  Explained to the pt in full detail with demonstrations how to use the inhalers and inhaler hygiene.

## 2024-06-13 NOTE — HISTORY OF PRESENT ILLNESS
[FreeTextEntry1] : Meera Gregg is a 57 y/o female with a history of allergies, asthma, GERD, DULCE MARIA, overweight, poor sleep, snoring, and SOB presenting to the office today for a follow up visit. Her chief complaint is  -she notes a recent hospitalization  -she notes having to go to the hospital due to chest pain  -she notes left sided pain that can be reproduced  -she notes bowels are regular -she notes her senses of smell and taste are no longer present s/p COVID 19  -she notes dysphonia -she notes going to PT s/p foot surgery  -she notes exercising (PT and at home)  -she notes back pain has stabilized -she notes poor quality of sleep -she notes her DULCE MARIA is untreated   -she notes bloat   -she denies any headaches, nausea, emesis, fever, chills, sweats, coughing, wheezing, palpitations, diarrhea, constipation, dysphagia, vertigo, arthralgias, myalgias, leg swelling, itchy eyes, itchy ears, heartburn, reflux, or sour taste in the mouth.

## 2024-06-13 NOTE — REASON FOR VISIT
[Follow-Up] : a follow-up visit [FreeTextEntry1] : asthma and chronic bronchitis, SOB, chest pain, OSAS

## 2024-06-13 NOTE — ADDENDUM
[FreeTextEntry1] : Documented by Abraham Wright acting as a scribe for Dr. Santiago Denton on 06/13/2024. All medical record entries made by the Scribe were at my, Dr. Santiago Denton's, direction and personally dictated by me on 06/13/2024. I have reviewed the chart and agree that the record accurately reflects my personal performance of the history, physical exam, assessment and plan. I have also personally directed, reviewed, and agree with the discharge instructions.

## 2024-06-13 NOTE — PROCEDURE
[FreeTextEntry1] : Full PFT reveals normal flows; FEV1 was  2.71L which is 119% of predicted; normal lung volumes; normal diffusion at 18.2, which is 85% of predicted; normal flow volume loop. PFTs were performed to evaluate for SOB

## 2024-07-22 ENCOUNTER — TRANSCRIPTION ENCOUNTER (OUTPATIENT)
Age: 59
End: 2024-07-22

## 2024-08-05 ENCOUNTER — APPOINTMENT (OUTPATIENT)
Dept: ORTHOPEDIC SURGERY | Facility: CLINIC | Age: 59
End: 2024-08-05

## 2024-08-05 PROBLEM — M51.36 DISC DEGENERATION, LUMBAR: Status: ACTIVE | Noted: 2024-08-05

## 2024-08-05 PROBLEM — M51.34 DEGENERATION OF THORACIC INTERVERTEBRAL DISC: Status: ACTIVE | Noted: 2024-08-05

## 2024-08-05 PROBLEM — M50.30 DEGENERATIVE CERVICAL DISC: Status: ACTIVE | Noted: 2024-08-05

## 2024-08-05 PROCEDURE — 99214 OFFICE O/P EST MOD 30 MIN: CPT

## 2024-08-05 NOTE — PHYSICAL EXAM
[de-identified] : Examination of the cervical spine reveals no midline or paraspinal tenderness to palpation. No cervical lymphadenopathy. Decreased range of motion with respect to flexion, extension, rotation, and lateral bending. Negative Spurlings. Negative Lhermitte's. Full range of motion bilateral shoulders without evidence of impingement. No instability of bilateral upper extremities.  Cranial nerves II through XII grossly intact. Intact sensation bilateral upper extremities. 5/5 deltoids biceps triceps wrist extensors wrist flexors finger flexors and hand intrinsics. 1+ biceps triceps and brachioradialis reflexes. Negative Hyman's. 2+ radial pulse. Negative Tinel's over the cubital and carpal tunnel. No skin lesions on the right and left upper extremities.  Examination of the lumbar spine reveals no midline tenderness palpation, step-offs, or skin lesions. Decreased range of motion with respect to flexion, extension, lateral bending, and rotation. No tenderness to palpation of the sciatic notch. No tenderness palpation of the bilateral greater trochanters. No pain with passive internal/external rotation of the hips. No instability of bilateral lower extremities.  Negative LENY. Negative straight leg raise bilaterally. No bowstring. Negative femoral stretch. 5 out of 5 iliopsoas, hip abductors, hips adductors, quadriceps, hamstrings, gastrocsoleus, tibialis anterior, extensor hallucis longus, peroneals. Grossly intact sensation to light touch bilateral lower extremities. 1+ patellar and Achilles reflexes. Downgoing Babinski. No clonus. Intact proprioception. Palpable pulses. No skin lesion and no edema on the right and left lower extremities. [de-identified] : Review of her cervical, thoracic, lumbar MRIs reveal some degenerative changes disc bulges with some areas of stenosis.  No high-grade central compression.

## 2024-08-05 NOTE — HISTORY OF PRESENT ILLNESS
[de-identified] : Ms. CELESTE BRITTON  is a 58 year old female who presents to the office for a follow-up visit.   She has persistent low back pain and right cervical radiculopathy.  She is here to review her MRI results.

## 2024-08-05 NOTE — DISCUSSION/SUMMARY
[de-identified] : We discussed further treatment options.  We reviewed her MRIs.  She will try some physical therapy.  She will let me know if any changes or worsening of her symptoms.

## 2024-08-07 ENCOUNTER — APPOINTMENT (OUTPATIENT)
Dept: SLEEP CENTER | Facility: CLINIC | Age: 59
End: 2024-08-07

## 2024-08-07 ENCOUNTER — OUTPATIENT (OUTPATIENT)
Dept: OUTPATIENT SERVICES | Facility: HOSPITAL | Age: 59
LOS: 1 days | End: 2024-08-07
Payer: COMMERCIAL

## 2024-08-07 PROCEDURE — 95800 SLP STDY UNATTENDED: CPT

## 2024-08-07 PROCEDURE — 95800 SLP STDY UNATTENDED: CPT | Mod: 26

## 2024-08-15 DIAGNOSIS — G47.33 OBSTRUCTIVE SLEEP APNEA (ADULT) (PEDIATRIC): ICD-10-CM

## 2024-08-20 ENCOUNTER — APPOINTMENT (OUTPATIENT)
Dept: ORTHOPEDIC SURGERY | Facility: CLINIC | Age: 59
End: 2024-08-20

## 2024-08-22 ENCOUNTER — APPOINTMENT (OUTPATIENT)
Dept: PULMONOLOGY | Facility: CLINIC | Age: 59
End: 2024-08-22
Payer: COMMERCIAL

## 2024-08-22 DIAGNOSIS — G47.30 SLEEP APNEA, UNSPECIFIED: ICD-10-CM

## 2024-08-22 PROCEDURE — 99441: CPT

## 2024-12-09 ENCOUNTER — APPOINTMENT (OUTPATIENT)
Dept: PULMONOLOGY | Facility: CLINIC | Age: 59
End: 2024-12-09
Payer: COMMERCIAL

## 2024-12-09 VITALS
HEIGHT: 61 IN | HEART RATE: 68 BPM | TEMPERATURE: 93.3 F | RESPIRATION RATE: 16 BRPM | BODY MASS INDEX: 30.4 KG/M2 | OXYGEN SATURATION: 98 % | DIASTOLIC BLOOD PRESSURE: 82 MMHG | SYSTOLIC BLOOD PRESSURE: 112 MMHG | WEIGHT: 161 LBS

## 2024-12-09 DIAGNOSIS — R06.02 SHORTNESS OF BREATH: ICD-10-CM

## 2024-12-09 DIAGNOSIS — G47.33 OBSTRUCTIVE SLEEP APNEA (ADULT) (PEDIATRIC): ICD-10-CM

## 2024-12-09 DIAGNOSIS — J30.9 ALLERGIC RHINITIS, UNSPECIFIED: ICD-10-CM

## 2024-12-09 DIAGNOSIS — J45.909 UNSPECIFIED ASTHMA, UNCOMPLICATED: ICD-10-CM

## 2024-12-09 DIAGNOSIS — R06.83 SNORING: ICD-10-CM

## 2024-12-09 DIAGNOSIS — K21.9 GASTRO-ESOPHAGEAL REFLUX DISEASE W/OUT ESOPHAGITIS: ICD-10-CM

## 2024-12-09 DIAGNOSIS — E66.3 OVERWEIGHT: ICD-10-CM

## 2024-12-09 PROCEDURE — 99214 OFFICE O/P EST MOD 30 MIN: CPT

## 2025-05-16 ENCOUNTER — NON-APPOINTMENT (OUTPATIENT)
Age: 60
End: 2025-05-16

## 2025-06-16 ENCOUNTER — APPOINTMENT (OUTPATIENT)
Dept: PULMONOLOGY | Facility: CLINIC | Age: 60
End: 2025-06-16